# Patient Record
Sex: FEMALE | Race: BLACK OR AFRICAN AMERICAN | NOT HISPANIC OR LATINO | Employment: FULL TIME | URBAN - METROPOLITAN AREA
[De-identification: names, ages, dates, MRNs, and addresses within clinical notes are randomized per-mention and may not be internally consistent; named-entity substitution may affect disease eponyms.]

---

## 2017-06-15 ENCOUNTER — LAB VISIT (OUTPATIENT)
Dept: LAB | Facility: HOSPITAL | Age: 22
End: 2017-06-15
Attending: NURSE PRACTITIONER
Payer: MEDICAID

## 2017-06-15 DIAGNOSIS — Z00.01 ENCOUNTER FOR GENERAL ADULT MEDICAL EXAMINATION WITH ABNORMAL FINDINGS: Primary | ICD-10-CM

## 2017-06-15 LAB
ALBUMIN SERPL BCP-MCNC: 3.7 G/DL
ALP SERPL-CCNC: 94 IU/L
ALT SERPL W/O P-5'-P-CCNC: 43 IU/L
ANION GAP SERPL CALC-SCNC: 10 MMOL/L
AST SERPL-CCNC: 37 IU/L
BASOPHILS # BLD AUTO: 0.03 K/UL
BASOPHILS NFR BLD: 0.4 %
BILIRUB SERPL-MCNC: 0.4 MG/DL
BUN SERPL-MCNC: 10 MG/DL
CALCIUM SERPL-MCNC: 8.8 MG/DL
CHLORIDE SERPL-SCNC: 107 MMOL/L
CHOLEST/HDLC SERPL: 4.5 {RATIO}
CO2 SERPL-SCNC: 25 MMOL/L
CREAT SERPL-MCNC: 0.67 MG/DL
DIFFERENTIAL METHOD: ABNORMAL
EOSINOPHIL # BLD AUTO: 0.3 K/UL
EOSINOPHIL NFR BLD: 3.2 %
ERYTHROCYTE [DISTWIDTH] IN BLOOD BY AUTOMATED COUNT: 15.3 %
EST. GFR  (AFRICAN AMERICAN): >60 ML/MIN/1.73 M^2
EST. GFR  (NON AFRICAN AMERICAN): >60 ML/MIN/1.73 M^2
GLUCOSE SERPL-MCNC: 82 MG/DL
HCT VFR BLD AUTO: 35.8 %
HDL/CHOLESTEROL RATIO: 22.4 %
HDLC SERPL-MCNC: 170 MG/DL
HDLC SERPL-MCNC: 38 MG/DL
HGB BLD-MCNC: 11.3 G/DL
LDLC SERPL CALC-MCNC: 100 MG/DL
LYMPHOCYTES # BLD AUTO: 3.4 K/UL
LYMPHOCYTES NFR BLD: 43.6 %
MCH RBC QN AUTO: 25.3 PG
MCHC RBC AUTO-ENTMCNC: 31.6 %
MCV RBC AUTO: 80 FL
MONOCYTES # BLD AUTO: 0.6 K/UL
MONOCYTES NFR BLD: 8.3 %
NEUTROPHILS # BLD AUTO: 3.4 K/UL
NEUTROPHILS NFR BLD: 44.2 %
NONHDLC SERPL-MCNC: 132 MG/DL
PLATELET # BLD AUTO: 477 K/UL
PMV BLD AUTO: 8.7 FL
POTASSIUM SERPL-SCNC: 3.8 MMOL/L
PROT SERPL-MCNC: 7.4 G/DL
RBC # BLD AUTO: 4.47 M/UL
SODIUM SERPL-SCNC: 142 MMOL/L
TRIGL SERPL-MCNC: 160 MG/DL
TSH SERPL DL<=0.005 MIU/L-ACNC: 1.33 UIU/ML
WBC # BLD AUTO: 7.71 K/UL

## 2017-06-15 PROCEDURE — 85025 COMPLETE CBC W/AUTO DIFF WBC: CPT | Mod: PO

## 2017-06-15 PROCEDURE — 36415 COLL VENOUS BLD VENIPUNCTURE: CPT | Mod: PO

## 2017-06-15 PROCEDURE — 84443 ASSAY THYROID STIM HORMONE: CPT

## 2017-06-15 PROCEDURE — 80061 LIPID PANEL: CPT

## 2017-06-15 PROCEDURE — 80053 COMPREHEN METABOLIC PANEL: CPT | Mod: PO

## 2017-06-15 PROCEDURE — 83036 HEMOGLOBIN GLYCOSYLATED A1C: CPT | Mod: PO

## 2017-06-16 LAB
ESTIMATED AVG GLUCOSE: 117 MG/DL
HBA1C MFR BLD HPLC: 5.7 %

## 2017-11-06 ENCOUNTER — HOSPITAL ENCOUNTER (EMERGENCY)
Facility: HOSPITAL | Age: 22
Discharge: HOME | End: 2017-11-07
Attending: FAMILY MEDICINE
Payer: MEDICAID

## 2017-11-06 VITALS
HEART RATE: 76 BPM | OXYGEN SATURATION: 100 % | RESPIRATION RATE: 20 BRPM | DIASTOLIC BLOOD PRESSURE: 83 MMHG | BODY MASS INDEX: 39.9 KG/M2 | SYSTOLIC BLOOD PRESSURE: 158 MMHG | WEIGHT: 285 LBS | HEIGHT: 71 IN

## 2017-11-06 DIAGNOSIS — N92.6 MENSTRUAL CYCLE DISORDER: Primary | ICD-10-CM

## 2017-11-06 PROCEDURE — 99283 EMERGENCY DEPT VISIT LOW MDM: CPT

## 2017-11-06 RX ORDER — LOSARTAN POTASSIUM 50 MG/1
50 TABLET ORAL DAILY
COMMUNITY

## 2017-11-07 LAB
B-HCG UR QL: NEGATIVE
BASOPHILS # BLD AUTO: 0.03 K/UL
BASOPHILS NFR BLD: 0.3 %
DIFFERENTIAL METHOD: ABNORMAL
EOSINOPHIL # BLD AUTO: 0.2 K/UL
EOSINOPHIL NFR BLD: 1.9 %
ERYTHROCYTE [DISTWIDTH] IN BLOOD BY AUTOMATED COUNT: 14.9 %
HCT VFR BLD AUTO: 37.1 %
HGB BLD-MCNC: 11.7 G/DL
LYMPHOCYTES # BLD AUTO: 4.2 K/UL
LYMPHOCYTES NFR BLD: 46.8 %
MCH RBC QN AUTO: 25.8 PG
MCHC RBC AUTO-ENTMCNC: 31.5 G/DL
MCV RBC AUTO: 82 FL
MONOCYTES # BLD AUTO: 0.5 K/UL
MONOCYTES NFR BLD: 6 %
NEUTROPHILS # BLD AUTO: 4 K/UL
NEUTROPHILS NFR BLD: 44.8 %
PLATELET # BLD AUTO: 464 K/UL
PMV BLD AUTO: 8.3 FL
RBC # BLD AUTO: 4.53 M/UL
WBC # BLD AUTO: 8.87 K/UL

## 2017-11-07 PROCEDURE — 81025 URINE PREGNANCY TEST: CPT

## 2017-11-07 PROCEDURE — 85025 COMPLETE CBC W/AUTO DIFF WBC: CPT

## 2017-11-07 NOTE — ED PROVIDER NOTES
Encounter Date: 11/6/2017       History     Chief Complaint   Patient presents with    Vaginal Bleeding     vaginal ,bleeding for 2 days     23 yo complaining of her period being heavier than normal. No pain. Last period 1 month ago      The history is provided by the patient.   Vaginal Bleeding   This is a new problem. The current episode started 12 to 24 hours ago. The problem occurs constantly. The problem has not changed since onset.Pertinent negatives include no chest pain, no abdominal pain and no shortness of breath. Nothing aggravates the symptoms. Nothing relieves the symptoms.     Review of patient's allergies indicates:  No Known Allergies  Past Medical History:   Diagnosis Date    Anemia     Hypertension      History reviewed. No pertinent surgical history.  History reviewed. No pertinent family history.  Social History   Substance Use Topics    Smoking status: Never Smoker    Smokeless tobacco: Never Used    Alcohol use Yes      Comment: occassinal     Review of Systems   Constitutional: Negative for fever.   HENT: Negative for sore throat.    Respiratory: Negative for shortness of breath.    Cardiovascular: Negative for chest pain.   Gastrointestinal: Negative for abdominal pain and nausea.   Genitourinary: Positive for vaginal bleeding. Negative for dysuria.   Musculoskeletal: Negative for back pain.   Skin: Negative for rash.   Neurological: Negative for weakness.   Hematological: Does not bruise/bleed easily.   All other systems reviewed and are negative.      Physical Exam     Initial Vitals [11/06/17 2354]   BP Pulse Resp Temp SpO2   (!) 158/83 76 20 -- 100 %      MAP       108         Physical Exam    Nursing note and vitals reviewed.  Constitutional: She appears well-developed.   HENT:   Head: Normocephalic and atraumatic.   Right Ear: External ear normal.   Left Ear: External ear normal.   Nose: Nose normal.   Mouth/Throat: Oropharynx is clear and moist.   Eyes: Conjunctivae and EOM are  normal. Pupils are equal, round, and reactive to light. Right eye exhibits no discharge. Left eye exhibits no discharge.   Neck: Normal range of motion. Neck supple. No tracheal deviation present.   Cardiovascular: Normal rate, regular rhythm and normal heart sounds.   No murmur heard.  Pulmonary/Chest: Breath sounds normal. No respiratory distress. She has no wheezes.   Abdominal: Soft. Bowel sounds are normal.   Neurological: She is alert and oriented to person, place, and time.   Skin: Skin is warm and dry.         ED Course   Procedures  Labs Reviewed   CBC W/ AUTO DIFFERENTIAL - Abnormal; Notable for the following:        Result Value    Hemoglobin 11.7 (*)     MCH 25.8 (*)     MCHC 31.5 (*)     RDW 14.9 (*)     Platelets 464 (*)     MPV 8.3 (*)     All other components within normal limits   PREGNANCY TEST, URINE RAPID             Medical Decision Making:   Initial Assessment:   Patient sitting in no distress and pleasant. Patient has no other complaints other than documented.     Differential Diagnosis:   Menstrual cycle  Miscarriage  pregnancy                   ED Course      Clinical Impression:   The encounter diagnosis was Menstrual cycle disorder.                           Varghese Chen MD  11/07/17 0056

## 2017-11-13 DIAGNOSIS — N92.0 EXCESSIVE OR FREQUENT MENSTRUATION: Primary | ICD-10-CM

## 2017-11-14 ENCOUNTER — HOSPITAL ENCOUNTER (OUTPATIENT)
Dept: RADIOLOGY | Facility: HOSPITAL | Age: 22
Discharge: HOME | End: 2017-11-14
Attending: NURSE PRACTITIONER
Payer: MEDICAID

## 2017-11-14 ENCOUNTER — TELEPHONE (OUTPATIENT)
Dept: OBSTETRICS AND GYNECOLOGY | Facility: CLINIC | Age: 22
End: 2017-11-14

## 2017-11-14 DIAGNOSIS — N92.0 EXCESSIVE OR FREQUENT MENSTRUATION: ICD-10-CM

## 2017-11-14 PROCEDURE — 76856 US EXAM PELVIC COMPLETE: CPT | Mod: TC,PO

## 2017-11-22 ENCOUNTER — HOSPITAL ENCOUNTER (EMERGENCY)
Facility: HOSPITAL | Age: 22
Discharge: HOME | End: 2017-11-22
Payer: MEDICAID

## 2017-11-22 VITALS
DIASTOLIC BLOOD PRESSURE: 81 MMHG | OXYGEN SATURATION: 100 % | BODY MASS INDEX: 41.02 KG/M2 | HEART RATE: 77 BPM | WEIGHT: 293 LBS | SYSTOLIC BLOOD PRESSURE: 130 MMHG | RESPIRATION RATE: 17 BRPM | HEIGHT: 71 IN | TEMPERATURE: 98 F

## 2017-11-22 DIAGNOSIS — N92.1 MENORRHAGIA WITH IRREGULAR CYCLE: Primary | ICD-10-CM

## 2017-11-22 LAB
B-HCG UR QL: NEGATIVE
BACTERIA #/AREA URNS AUTO: ABNORMAL /HPF
BASOPHILS # BLD AUTO: 0.04 K/UL
BASOPHILS NFR BLD: 0.5 %
BILIRUB UR QL STRIP: NEGATIVE
CLARITY UR REFRACT.AUTO: ABNORMAL
COLOR UR AUTO: ABNORMAL
DIFFERENTIAL METHOD: ABNORMAL
EOSINOPHIL # BLD AUTO: 0.2 K/UL
EOSINOPHIL NFR BLD: 2 %
ERYTHROCYTE [DISTWIDTH] IN BLOOD BY AUTOMATED COUNT: 14.4 %
GLUCOSE UR QL STRIP: NEGATIVE
HCT VFR BLD AUTO: 31.6 %
HGB BLD-MCNC: 9.7 G/DL
HGB UR QL STRIP: ABNORMAL
HYALINE CASTS UR QL AUTO: 0 /LPF
KETONES UR QL STRIP: NEGATIVE
LEUKOCYTE ESTERASE UR QL STRIP: ABNORMAL
LYMPHOCYTES # BLD AUTO: 3.6 K/UL
LYMPHOCYTES NFR BLD: 40.7 %
MCH RBC QN AUTO: 25 PG
MCHC RBC AUTO-ENTMCNC: 30.7 G/DL
MCV RBC AUTO: 81 FL
MICROSCOPIC COMMENT: ABNORMAL
MONOCYTES # BLD AUTO: 0.9 K/UL
MONOCYTES NFR BLD: 9.8 %
NEUTROPHILS # BLD AUTO: 4.1 K/UL
NEUTROPHILS NFR BLD: 46.8 %
NITRITE UR QL STRIP: NEGATIVE
PH UR STRIP: 7 [PH] (ref 5–8)
PLATELET # BLD AUTO: 545 K/UL
PMV BLD AUTO: 8.3 FL
PROT UR QL STRIP: ABNORMAL
RBC # BLD AUTO: 3.88 M/UL
RBC #/AREA URNS AUTO: 50 /HPF (ref 0–4)
SP GR UR STRIP: 1.01 (ref 1–1.03)
URN SPEC COLLECT METH UR: ABNORMAL
UROBILINOGEN UR STRIP-ACNC: 1 EU/DL
WBC # BLD AUTO: 8.84 K/UL
WBC #/AREA URNS AUTO: 1 /HPF (ref 0–5)

## 2017-11-22 PROCEDURE — 96360 HYDRATION IV INFUSION INIT: CPT

## 2017-11-22 PROCEDURE — 81025 URINE PREGNANCY TEST: CPT

## 2017-11-22 PROCEDURE — 85025 COMPLETE CBC W/AUTO DIFF WBC: CPT

## 2017-11-22 PROCEDURE — 25000003 PHARM REV CODE 250: Performed by: NURSE PRACTITIONER

## 2017-11-22 PROCEDURE — 81000 URINALYSIS NONAUTO W/SCOPE: CPT

## 2017-11-22 PROCEDURE — 99283 EMERGENCY DEPT VISIT LOW MDM: CPT

## 2017-11-22 RX ORDER — TRAMADOL HYDROCHLORIDE 50 MG/1
50 TABLET ORAL EVERY 6 HOURS PRN
COMMUNITY

## 2017-11-22 RX ADMIN — SODIUM CHLORIDE 1000 ML: 0.9 INJECTION, SOLUTION INTRAVENOUS at 05:11

## 2017-11-23 NOTE — ED PROVIDER NOTES
Encounter Date: 11/22/2017       History     Chief Complaint   Patient presents with    Vaginal Bleeding     Pt states has had vaginal bleeding since 11/3/17, states has had vaginal US and bloodwork done for same c/o per Dr Scherer.  Pt states has been wearing tampons and pads.    Also states has been having toothache.       22-year-old female presents to emergency room with vaginal bleeding since 11/3/17.  Patient states she has been seen by the emergency room primary care and ultrasound.  States bleeding has since increased.  Reports occasional dizziness.  Denies any dizziness at this time.  States she saturates 1 pad an hour and has to wear an adult brief because of the heaviness of the bleeding.  Denies any abdominal cramping.  Reports always having abnormal menstrual cycles but never to this extreme.  States last normal menstrual period was in September.  Denies any recent pregnancies.  Reports a miscarriage one year ago.  Has not taken birth control medications in approximately 6 months.          Review of patient's allergies indicates:  No Known Allergies  Past Medical History:   Diagnosis Date    Anemia     Hypertension      History reviewed. No pertinent surgical history.  Family History   Problem Relation Age of Onset    Hypertension Sister     Cancer Maternal Grandmother     Diabetes Maternal Grandmother      Social History   Substance Use Topics    Smoking status: Never Smoker    Smokeless tobacco: Never Used    Alcohol use Yes      Comment: occassinal     Review of Systems   Constitutional: Negative for fever.   HENT: Negative for sore throat.    Respiratory: Negative for shortness of breath.    Cardiovascular: Negative for chest pain.   Gastrointestinal: Negative for nausea.   Genitourinary: Positive for vaginal bleeding. Negative for dysuria.   Musculoskeletal: Negative for back pain.   Skin: Negative for rash.   Neurological: Positive for dizziness. Negative for weakness.   Hematological:  Does not bruise/bleed easily.   All other systems reviewed and are negative.      Physical Exam     Initial Vitals [11/22/17 1619]   BP Pulse Resp Temp SpO2   (!) 161/80 75 18 97.7 °F (36.5 °C) 100 %      MAP       107         Physical Exam    Nursing note and vitals reviewed.  Constitutional: She appears well-developed and well-nourished. No distress.   HENT:   Head: Normocephalic.   Eyes: Conjunctivae are normal.   Neck: Normal range of motion. Neck supple.   Cardiovascular: Normal rate.   Pulmonary/Chest: Breath sounds normal. No respiratory distress.   Abdominal: Soft. Bowel sounds are normal. She exhibits no distension and no abdominal bruit. There is no tenderness. No hernia.   Neurological: She is alert and oriented to person, place, and time.   Skin: Skin is warm and dry. Capillary refill takes less than 2 seconds.   Psychiatric: She has a normal mood and affect. Her behavior is normal. Judgment and thought content normal.         ED Course   Procedures  Labs Reviewed   CBC W/ AUTO DIFFERENTIAL - Abnormal; Notable for the following:        Result Value    RBC 3.88 (*)     Hemoglobin 9.7 (*)     Hematocrit 31.6 (*)     MCV 81 (*)     MCH 25.0 (*)     MCHC 30.7 (*)     Platelets 545 (*)     MPV 8.3 (*)     All other components within normal limits   URINALYSIS - Abnormal; Notable for the following:     Color, UA Red (*)     Appearance, UA Hazy (*)     Protein, UA 1+ (*)     Occult Blood UA 3+ (*)     Leukocytes, UA 3+ (*)     All other components within normal limits   PREGNANCY TEST, URINE RAPID   URINALYSIS MICROSCOPIC             Medical Decision Making:   Initial Assessment:   22-year-old female presents to emergency room with vaginal bleeding since 11/3/17.  Patient states she has been seen by the emergency room primary care and ultrasound.  States bleeding has since increased.  Reports occasional dizziness.  Denies any dizziness at this time.  States she saturates 1 pad an hour and has to wear an adult  brief because of the heaviness of the bleeding.  Denies any abdominal cramping.  Reports always having abnormal menstrual cycles but never to this extreme.  States last normal menstrual period was in September.  Denies any recent pregnancies.  Reports a miscarriage one year ago.  Has not taken birth control medications in approximately 6 months.  Differential Diagnosis:   Vaginal bleeding, irregular menses, dysmenorrhea, vaginal lesion, cervical cancer, ovarian cancer,  Clinical Tests:   Lab Tests: Ordered and Reviewed  Radiological Study: Reviewed  ED Management:  Review the patient's ultrasound from 1 week ago.  There was no acute findings.  Uterus lining was 4 mm.  Urine specimen has +3 blood however it was not a sterile specimen.  I believe this bleeding is due to vaginal blood.  Hemoglobin decreased 2 points and 2 weeks.  I spoke with Dr. Meyer who is on-call for OB/GYN.  A message was sent to Dr. Burnett staff to have patient seen in the next week in office.  I discussed birth control with patient.  The patient states she received a prescription one month ago from a doctor in [place] when her Pap smear was performed but she has not taken the medications.  I encouraged the patient to take the birth control as previously prescribed and follow-up with primary care and Dr. Burnett.  Patient verbalized understanding.  I educated the patient on worsening symptoms of blood loss and when to return to the emergency room.  Patient verbalized understanding.                   ED Course      Clinical Impression:   The encounter diagnosis was Menorrhagia with irregular cycle.                           Shilpa Patel NP  11/29/17 8632

## 2017-11-23 NOTE — ED NOTES
Pt awaiting lab results. Pt has no c/o anything at this time. Watching tv with male friend at bedside

## 2017-11-24 ENCOUNTER — TELEPHONE (OUTPATIENT)
Dept: OBSTETRICS AND GYNECOLOGY | Facility: CLINIC | Age: 22
End: 2017-11-24

## 2017-11-24 NOTE — TELEPHONE ENCOUNTER
----- Message from Julissa Meyer MD sent at 11/22/2017  6:31 PM CST -----  This pt has been seen in the ED twice for heavy menstrual bleeding with normal U/S and H/H:9.7/31.6 and wanted to f/u Dr. Burnett in [facility].

## 2017-11-27 ENCOUNTER — TELEPHONE (OUTPATIENT)
Dept: OBSTETRICS AND GYNECOLOGY | Facility: CLINIC | Age: 22
End: 2017-11-27

## 2017-11-27 NOTE — TELEPHONE ENCOUNTER
----- Message from Julissa Meyer MD sent at 11/22/2017  6:31 PM CST -----  This pt has been seen in the ED twice for heavy menstrual bleeding with normal U/S and H/H:9.7/31.6 and wanted to f/u Dr. Burnett in [facility].

## 2018-10-03 ENCOUNTER — OCCUPATIONAL HEALTH (OUTPATIENT)
Dept: URGENT CARE | Facility: CLINIC | Age: 23
End: 2018-10-03

## 2018-10-03 DIAGNOSIS — Z02.83 ENCOUNTER FOR DRUG SCREENING: Primary | ICD-10-CM

## 2018-10-03 PROCEDURE — 80305 DRUG TEST PRSMV DIR OPT OBS: CPT | Mod: S$GLB,,, | Performed by: PREVENTIVE MEDICINE

## 2020-11-05 ENCOUNTER — HOSPITAL ENCOUNTER (EMERGENCY)
Facility: HOSPITAL | Age: 25
Discharge: HOME | End: 2020-11-05
Attending: EMERGENCY MEDICINE
Payer: MEDICAID

## 2020-11-05 VITALS
BODY MASS INDEX: 41.02 KG/M2 | OXYGEN SATURATION: 100 % | WEIGHT: 293 LBS | DIASTOLIC BLOOD PRESSURE: 91 MMHG | TEMPERATURE: 99 F | HEART RATE: 85 BPM | HEIGHT: 71 IN | RESPIRATION RATE: 16 BRPM | SYSTOLIC BLOOD PRESSURE: 129 MMHG

## 2020-11-05 DIAGNOSIS — R07.89 LEFT-SIDED CHEST WALL PAIN: Primary | ICD-10-CM

## 2020-11-05 DIAGNOSIS — R07.9 CHEST PAIN: ICD-10-CM

## 2020-11-05 LAB
ALBUMIN SERPL BCP-MCNC: 3.5 G/DL (ref 3.5–5.2)
ALP SERPL-CCNC: 122 U/L (ref 55–135)
ALT SERPL W/O P-5'-P-CCNC: 36 U/L (ref 10–44)
ANION GAP SERPL CALC-SCNC: 11 MMOL/L (ref 8–16)
AST SERPL-CCNC: 50 U/L (ref 10–40)
B-HCG UR QL: NEGATIVE
BASOPHILS # BLD AUTO: 0.04 K/UL (ref 0–0.2)
BASOPHILS NFR BLD: 0.4 % (ref 0–1.9)
BILIRUB SERPL-MCNC: 0.2 MG/DL (ref 0.1–1)
BUN SERPL-MCNC: 4 MG/DL (ref 6–20)
CALCIUM SERPL-MCNC: 8.9 MG/DL (ref 8.7–10.5)
CHLORIDE SERPL-SCNC: 105 MMOL/L (ref 95–110)
CO2 SERPL-SCNC: 21 MMOL/L (ref 23–29)
CREAT SERPL-MCNC: 0.7 MG/DL (ref 0.5–1.4)
CTP QC/QA: YES
DIFFERENTIAL METHOD: ABNORMAL
EOSINOPHIL # BLD AUTO: 0.2 K/UL (ref 0–0.5)
EOSINOPHIL NFR BLD: 2.1 % (ref 0–8)
ERYTHROCYTE [DISTWIDTH] IN BLOOD BY AUTOMATED COUNT: 15.9 % (ref 11.5–14.5)
EST. GFR  (AFRICAN AMERICAN): >60 ML/MIN/1.73 M^2
EST. GFR  (NON AFRICAN AMERICAN): >60 ML/MIN/1.73 M^2
GLUCOSE SERPL-MCNC: 136 MG/DL (ref 70–110)
HCT VFR BLD AUTO: 35.5 % (ref 37–48.5)
HGB BLD-MCNC: 10.4 G/DL (ref 12–16)
IMM GRANULOCYTES # BLD AUTO: 0.03 K/UL (ref 0–0.04)
IMM GRANULOCYTES NFR BLD AUTO: 0.3 % (ref 0–0.5)
LYMPHOCYTES # BLD AUTO: 3.9 K/UL (ref 1–4.8)
LYMPHOCYTES NFR BLD: 39.4 % (ref 18–48)
MCH RBC QN AUTO: 23 PG (ref 27–31)
MCHC RBC AUTO-ENTMCNC: 29.3 G/DL (ref 32–36)
MCV RBC AUTO: 79 FL (ref 82–98)
MONOCYTES # BLD AUTO: 0.7 K/UL (ref 0.3–1)
MONOCYTES NFR BLD: 7.4 % (ref 4–15)
NEUTROPHILS # BLD AUTO: 5 K/UL (ref 1.8–7.7)
NEUTROPHILS NFR BLD: 50.4 % (ref 38–73)
NRBC BLD-RTO: 0 /100 WBC
PLATELET # BLD AUTO: 504 K/UL (ref 150–350)
PMV BLD AUTO: 8.4 FL (ref 9.2–12.9)
POTASSIUM SERPL-SCNC: 4.3 MMOL/L (ref 3.5–5.1)
PROT SERPL-MCNC: 8.2 G/DL (ref 6–8.4)
RBC # BLD AUTO: 4.52 M/UL (ref 4–5.4)
SODIUM SERPL-SCNC: 137 MMOL/L (ref 136–145)
TROPONIN I SERPL DL<=0.01 NG/ML-MCNC: 0.01 NG/ML (ref 0–0.03)
WBC # BLD AUTO: 9.93 K/UL (ref 3.9–12.7)

## 2020-11-05 PROCEDURE — 93010 ELECTROCARDIOGRAM REPORT: CPT | Mod: ,,, | Performed by: INTERNAL MEDICINE

## 2020-11-05 PROCEDURE — 99284 PR EMERGENCY DEPT VISIT,LEVEL IV: ICD-10-PCS | Mod: ,,, | Performed by: EMERGENCY MEDICINE

## 2020-11-05 PROCEDURE — 99285 EMERGENCY DEPT VISIT HI MDM: CPT | Mod: 25

## 2020-11-05 PROCEDURE — 25000003 PHARM REV CODE 250: Performed by: EMERGENCY MEDICINE

## 2020-11-05 PROCEDURE — 80053 COMPREHEN METABOLIC PANEL: CPT

## 2020-11-05 PROCEDURE — 84484 ASSAY OF TROPONIN QUANT: CPT

## 2020-11-05 PROCEDURE — 99284 EMERGENCY DEPT VISIT MOD MDM: CPT | Mod: ,,, | Performed by: EMERGENCY MEDICINE

## 2020-11-05 PROCEDURE — 96360 HYDRATION IV INFUSION INIT: CPT

## 2020-11-05 PROCEDURE — 85025 COMPLETE CBC W/AUTO DIFF WBC: CPT

## 2020-11-05 PROCEDURE — 93005 ELECTROCARDIOGRAM TRACING: CPT

## 2020-11-05 PROCEDURE — 81025 URINE PREGNANCY TEST: CPT | Performed by: EMERGENCY MEDICINE

## 2020-11-05 PROCEDURE — 93010 EKG 12-LEAD: ICD-10-PCS | Mod: ,,, | Performed by: INTERNAL MEDICINE

## 2020-11-05 RX ORDER — MEDROXYPROGESTERONE ACETATE 10 MG/1
10 TABLET ORAL DAILY
COMMUNITY

## 2020-11-05 RX ORDER — NAPROXEN 500 MG/1
500 TABLET ORAL 2 TIMES DAILY WITH MEALS
Qty: 30 TABLET | Refills: 0 | Status: SHIPPED | OUTPATIENT
Start: 2020-11-05

## 2020-11-05 RX ORDER — METFORMIN HYDROCHLORIDE 500 MG/1
500 TABLET ORAL 2 TIMES DAILY WITH MEALS
COMMUNITY

## 2020-11-05 RX ORDER — NAPROXEN 500 MG/1
500 TABLET ORAL
Status: COMPLETED | OUTPATIENT
Start: 2020-11-05 | End: 2020-11-05

## 2020-11-05 RX ADMIN — NAPROXEN 500 MG: 500 TABLET ORAL at 08:11

## 2020-11-05 RX ADMIN — SODIUM CHLORIDE 1000 ML: 0.9 INJECTION, SOLUTION INTRAVENOUS at 08:11

## 2020-11-05 NOTE — Clinical Note
"Lisebth Estes" Dean was seen and treated in our emergency department on 11/5/2020.  She may return to work on 11/09/2020.       If you have any questions or concerns, please don't hesitate to call.      Brown Weber RN    "

## 2020-11-05 NOTE — Clinical Note
"Lisbeth Estes" Dean was seen and treated in our emergency department on 11/5/2020.  She may return to work on 11/09/2020.       If you have any questions or concerns, please don't hesitate to call.      Brown Weber RN    "

## 2020-11-06 NOTE — ED TRIAGE NOTES
Pt states that she started to feel tightness in her that started yesterday. Pt states that tightness and pain increased today. Pt states that as long as she remains still she doesn't feel pain, any movement causes a sharp pain in the right side of her chest pt rates as 10 out of 10 when she moves. Pt denies SOB or N/V/D.

## 2020-11-06 NOTE — ED PROVIDER NOTES
"Encounter Date: 11/5/2020    SCRIBE #1 NOTE: I, Mireya Gagandeep, am scribing for, and in the presence of,  Ethan Salvador MD. I have scribed the following portions of the note - Other sections scribed: HPI ROS PE.       History     Chief Complaint   Patient presents with    Chest Pain     Pt reports onset of left sided CP 12 hours ago while sitting down at work. Pt reports that pain radiates down her left arm. Pt describes pain as "someone punching me." Pt denies cardiac pmh.     Time patient was seen by the provider: 8:25 PM      The patient is a 25 y.o. female with past medical history of anemia, HTN, who presents to the ED with a complaint of chest pain onset yesterday. The patient reports of a sharp, pressure sensation to the left side of her chest and radiates to her left arm. The pain has been gradually worsening since onset. The pain is exacerbated by movement of her left arm. Denies any injuries or trauma to the chest wall. Denies history of DVT or PE. Patient is on Provera. Denies nausea, vomiting, diarrhea, fever, cough, SOB, abdominal pain, dysuria. A ten point review of systems was completed and is negative except as documented above.  Patient denies any other acute medical complaint. The patients available PMH, PSH, Social History, medications, allergies, and triage vital signs were reviewed just prior to their medical evaluation.      The history is provided by the patient and medical records. No  was used.     Review of patient's allergies indicates:  No Known Allergies  Past Medical History:   Diagnosis Date    Anemia     Hypertension      History reviewed. No pertinent surgical history.  Family History   Problem Relation Age of Onset    Hypertension Sister     Cancer Maternal Grandmother     Diabetes Maternal Grandmother      Social History     Tobacco Use    Smoking status: Current Every Day Smoker     Types: Cigarettes    Smokeless tobacco: Never Used    Tobacco " comment: Few cigarettes a day   Substance Use Topics    Alcohol use: Yes     Comment: occassinal    Drug use: No     Review of Systems   Constitutional: Negative for fever.   HENT: Negative for sore throat.    Eyes: Negative for visual disturbance.   Respiratory: Negative for cough and shortness of breath.    Cardiovascular: Positive for chest pain.   Gastrointestinal: Negative for abdominal pain, diarrhea, nausea and vomiting.   Genitourinary: Negative for dysuria.   Musculoskeletal: Negative for neck pain.   Skin: Negative for rash and wound.   Allergic/Immunologic: Negative for immunocompromised state.   Neurological: Negative for syncope.   Psychiatric/Behavioral: Negative for confusion.       Physical Exam     Initial Vitals   BP Pulse Resp Temp SpO2   11/05/20 1929 11/05/20 1928 11/05/20 1928 11/05/20 1928 11/05/20 1928   (!) 191/115 (!) 127 20 99.1 °F (37.3 °C) 100 %      MAP       --                Physical Exam    Nursing note and vitals reviewed.  Constitutional: She appears well-developed and well-nourished. She is not diaphoretic. No distress.   HENT:   Head: Normocephalic and atraumatic.   Nose: Nose normal.   Eyes: EOM are normal. Pupils are equal, round, and reactive to light. Right eye exhibits no discharge. Left eye exhibits no discharge.   Neck: Normal range of motion. Neck supple.   Cardiovascular: Regular rhythm and normal heart sounds. Tachycardia present.  Exam reveals no gallop and no friction rub.    No murmur heard.  Pulmonary/Chest: Breath sounds normal. No respiratory distress. She has no wheezes. She has no rhonchi. She has no rales. She exhibits tenderness.   Left chest wall tenderness to palpation.   Abdominal: Soft. She exhibits no distension. There is no abdominal tenderness. There is no rebound and no guarding.   Musculoskeletal: Normal range of motion. No tenderness or edema.   Neurological: She is alert and oriented to person, place, and time. She has normal strength. GCS score  is 15. GCS eye subscore is 4. GCS verbal subscore is 5. GCS motor subscore is 6.   Skin: Skin is warm and dry. No rash noted. No erythema.   Psychiatric: She has a normal mood and affect. Her behavior is normal. Judgment and thought content normal.         ED Course   Procedures  Labs Reviewed   CBC W/ AUTO DIFFERENTIAL - Abnormal; Notable for the following components:       Result Value    Hemoglobin 10.4 (*)     Hematocrit 35.5 (*)     MCV 79 (*)     MCH 23.0 (*)     MCHC 29.3 (*)     RDW 15.9 (*)     Platelets 504 (*)     MPV 8.4 (*)     All other components within normal limits   COMPREHENSIVE METABOLIC PANEL - Abnormal; Notable for the following components:    CO2 21 (*)     Glucose 136 (*)     BUN 4 (*)     AST 50 (*)     All other components within normal limits   TROPONIN I   POCT URINE PREGNANCY     EKG Readings: (Independently Interpreted)   Initial Reading: No STEMI. Rhythm: Sinus Tachycardia. Heart Rate: 109. Ectopy: No Ectopy. Conduction: Normal. ST Segments: Normal ST Segments. T Waves: Normal.     ECG Results          EKG 12-lead (Final result)  Result time 11/06/20 13:41:59    Final result by Interface, Lab In [facility] (11/06/20 13:41:59)                 Narrative:    Test Reason : R07.9,    Vent. Rate : 109 BPM     Atrial Rate : 109 BPM     P-R Int : 166 ms          QRS Dur : 100 ms      QT Int : 346 ms       P-R-T Axes : 056 035 048 degrees     QTc Int : 465 ms    Sinus tachycardia  Otherwise normal ECG  When compared with ECG of 10-SEP-2017 21:40,  No significant change was found  Confirmed by VELIA KAUR MD ([id number]) on 11/6/2020 1:41:52 PM    Referred By: [email or web address]   SELF           Confirmed By:VELIA KAUR MD                            Imaging Results          X-Ray Chest PA And Lateral (Final result)  Result time 11/05/20 21:10:40    Final result by Kofi Barraza MD (11/05/20 21:10:40)                 Impression:      No acute radiographic abnormality      Electronically signed by: Kofi  Ramona  Date:    11/05/2020  Time:    21:10             Narrative:    EXAMINATION:  XR CHEST PA AND LATERAL    CLINICAL HISTORY:  Other chest pain    TECHNIQUE:  PA and lateral views of the chest were performed.    COMPARISON:  None    FINDINGS:  The lungs are clear, with normal appearance of pulmonary vasculature and no pleural effusion or pneumothorax.    The cardiac silhouette is normal in size. The hilar and mediastinal contours are unremarkable.    Bones are intact.                                 Medical Decision Making:   History:   Old Medical Records: I decided to obtain old medical records.  Independently Interpreted Test(s):   I have ordered and independently interpreted EKG Reading(s) - see prior notes  Clinical Tests:   Lab Tests: Ordered and Reviewed  Radiological Study: Ordered and Reviewed  Medical Tests: Ordered and Reviewed  ED Management:  25-year-old female presents with left-sided chest wall pain.  Vitals normal.  Physical exam as above.  Labs unremarkable including troponin x1.  No indication for repeat troponin testing given duration of symptoms.  EKG without acute ischemia.  Chest x-ray unremarkable.  Doubt ACS, PE, dissection, PNX, PNA, or tamponade.  Improved in the ED with naproxen.  Will discharge with the same.  She will call her primary physician on Monday to arrange close follow-up.  Patient will return to ED for worsening symptoms, inability to eat/drink, fever greater than 100.4, or any other concerns.  Did bedside teaching with return precautions.  All questions answered.  The patient acknowledges understanding.  Gave verbal discharge instructions.            Scribe Attestation:   Scribe #1: I performed the above scribed service and the documentation accurately describes the services I performed. I attest to the accuracy of the note.                      Clinical Impression:       ICD-10-CM ICD-9-CM   1. Left-sided chest wall pain  R07.89 786.52   2. Chest pain  R07.9 786.50                       Disposition:   Disposition: Discharged  Condition: Stable     ED Disposition Condition    Discharge Stable        ED Prescriptions     Medication Sig Dispense Start Date End Date Auth. Provider    naproxen (NAPROSYN) 500 MG tablet Take 1 tablet (500 mg total) by mouth 2 (two) times daily with meals. 30 tablet 11/5/2020  Ethan Salvador MD        Follow-up Information     Follow up With Specialties Details Why Contact Info    Follow up with primary physician as soon as possible.  Call tomorrow for an appointment.        [facility]  Return to ED for worsening symptoms, inability to eat/drink, fever greater than 100.4, or any other concerns. [address] [facility] [id number] [phone]                      Level of Complexity:  High, level 5.                 Ethan Salvador MD  11/06/20 2460

## 2020-11-06 NOTE — ED NOTES
Patient identifiers for Lisbeth Moran checked and correct.  LOC: Patient is awake, alert, and aware of environment with an appropriate affect. Patient is oriented x 3 and speaking appropriately.  APPEARANCE: Patient resting comfortably and in no acute distress. Patient is clean and well groomed, patient's clothing is properly fastened.  SKIN: The skin is warm and dry. Patient has normal skin turgor and moist mucus membrances. Skin is intact; no bruising or breakdown noted.  MUSKULOSKELETAL: Patient is moving all extremities well, no obvious deformities noted. Pulses intact.   RESPIRATORY: Airway is open and patent. Respirations are spontaneous and non-labored with normal effort and rate.  CARDIAC: Patient has a normal rate and rhythm. No peripheral edema noted. Capillary refill < 3 seconds.  ABDOMEN: No distention noted. Bowel sounds active in all 4 quadrants. Soft and non-tender upon palpation.  NEUROLOGICAL: PERRL. Facial expression is symmetrical. Hand grasps are equal bilaterally. Normal sensation in all extremities when touched with finger.  Allergies reported: Review of patient's allergies indicates:  No Known Allergies

## 2020-11-06 NOTE — DISCHARGE INSTRUCTIONS
Do not take motrin/advil/ibuprofen.  You may take Tylenol over the counter as directed on packaging.     Our goal in the emergency department is to always give you outstanding care and exceptional service. You may receive a survey by mail or e-mail in the next week regarding your experience in our ED. We would greatly appreciate your completing and returning the survey. Your feedback provides us with a way to recognize our staff who give very good care and it helps us learn how to improve when your experience was below our aspiration of excellence.

## 2021-12-06 ENCOUNTER — HOSPITAL ENCOUNTER (OUTPATIENT)
Dept: MEDSURG UNIT | Facility: HOSPITAL | Age: 26
End: 2021-12-08
Attending: INTERNAL MEDICINE | Admitting: INTERNAL MEDICINE

## 2021-12-06 LAB
% SATURATION: 74 %
ABS NEUT (OLG): 3.19 X10(3)/MCL (ref 2.1–9.2)
ALBUMIN SERPL-MCNC: 4.3 GM/DL (ref 3.5–5)
ALBUMIN/GLOB SERPL: 1.1 RATIO (ref 1.1–2)
ALP SERPL-CCNC: 195 UNIT/L (ref 40–150)
ALT SERPL-CCNC: 36 UNIT/L (ref 0–55)
APPEARANCE, UA: CLEAR
AST SERPL-CCNC: 18 UNIT/L (ref 5–34)
B-OH-BUTYR SERPL-MCNC: 2.95 MMOL/L
B-OH-BUTYR SERPL-MCNC: 3.15 MMOL/L
BACTERIA #/AREA URNS AUTO: ABNORMAL /HPF
BASOPHILS # BLD AUTO: 0.1 X10(3)/MCL (ref 0–0.2)
BASOPHILS NFR BLD AUTO: 1 %
BILIRUB SERPL-MCNC: 0.4 MG/DL
BILIRUB SERPL-MCNC: NEGATIVE MG/DL
BILIRUB UR QL STRIP: NEGATIVE
BILIRUBIN DIRECT+TOT PNL SERPL-MCNC: 0.2 MG/DL (ref 0–0.5)
BILIRUBIN DIRECT+TOT PNL SERPL-MCNC: 0.2 MG/DL (ref 0–0.8)
BLOOD URINE, POC: NEGATIVE
BUN SERPL-MCNC: 6.4 MG/DL (ref 7–18.7)
BUN SERPL-MCNC: 7.8 MG/DL (ref 7–18.7)
CALCIUM SERPL-MCNC: 10.9 MG/DL (ref 8.7–10.5)
CALCIUM SERPL-MCNC: 9.7 MG/DL (ref 8.7–10.5)
CHLORIDE SERPL-SCNC: 103 MMOL/L (ref 98–107)
CHLORIDE SERPL-SCNC: 98 MMOL/L (ref 98–107)
CLARITY, POC UA: CLEAR
CO HGB VEN: 3.5 % (ref 0.5–1.5)
CO2 SERPL-SCNC: 21 MMOL/L (ref 22–29)
CO2 SERPL-SCNC: 21 MMOL/L (ref 22–29)
COLOR UR: ABNORMAL
COLOR, POC UA: YELLOW
CREAT SERPL-MCNC: 0.97 MG/DL (ref 0.55–1.02)
CREAT SERPL-MCNC: 1.23 MG/DL (ref 0.55–1.02)
CREAT/UREA NIT SERPL: 7
D BASE VENOUS: -4 (ref -2–2)
EOSINOPHIL # BLD AUTO: 0.3 X10(3)/MCL (ref 0–0.9)
EOSINOPHIL NFR BLD AUTO: 3 %
ERYTHROCYTE [DISTWIDTH] IN BLOOD BY AUTOMATED COUNT: 14.6 % (ref 11.5–14.5)
EST. AVERAGE GLUCOSE BLD GHB EST-MCNC: 237.4 MG/DL
GLOBULIN SER-MCNC: 3.9 GM/DL (ref 2.4–3.5)
GLUCOSE (UA): >1000 MG/DL
GLUCOSE SERPL-MCNC: 346 MG/DL (ref 74–100)
GLUCOSE SERPL-MCNC: 474 MG/DL (ref 74–100)
GLUCOSE UR QL STRIP: NORMAL
HBA1C MFR BLD: 9.9 %
HCO3 VENOUS: 23 MMOL/L (ref 25–40)
HCT VFR BLD AUTO: 38.2 % (ref 35–46)
HGB BLD-MCNC: 12.5 GM/DL (ref 12–16)
HGB UR QL STRIP: 0.06 MG/DL
HYALINE CASTS #/AREA URNS LPF: ABNORMAL /LPF
IMM GRANULOCYTES # BLD AUTO: 0.01 10*3/UL
IMM GRANULOCYTES NFR BLD AUTO: 0 %
KETONES UR QL STRIP: >150 MG/DL
KETONES UR QL STRIP: NORMAL
LEUKOCYTE EST, POC UA: NEGATIVE
LEUKOCYTE ESTERASE UR QL STRIP: NEGATIVE
LYMPHOCYTES # BLD AUTO: 4.2 X10(3)/MCL (ref 0.6–4.6)
LYMPHOCYTES NFR BLD AUTO: 50 %
MAGNESIUM SERPL-MCNC: 1.7 MG/DL (ref 1.6–2.6)
MAGNESIUM SERPL-MCNC: 1.8 MG/DL (ref 1.6–2.6)
MCH RBC QN AUTO: 26.5 PG (ref 26–34)
MCHC RBC AUTO-ENTMCNC: 32.7 GM/DL (ref 31–37)
MCV RBC AUTO: 80.9 FL (ref 80–100)
MET HGB VEN: 1.1 % (ref 0–1.5)
MONOCYTES # BLD AUTO: 0.8 X10(3)/MCL (ref 0.1–1.3)
MONOCYTES NFR BLD AUTO: 9 %
NEUTROPHILS # BLD AUTO: 3.19 X10(3)/MCL (ref 2.1–9.2)
NEUTROPHILS NFR BLD AUTO: 37 %
NITRITE UR QL STRIP: NEGATIVE
NITRITE, POC UA: NEGATIVE
NRBC BLD AUTO-RTO: 0 % (ref 0–0.2)
O2 HGB VENOUS: 70.9 % (ref 40–85)
PCO2 VENOUS: 51 MMHG (ref 41–51)
PH UR STRIP: 5.5 [PH] (ref 4.5–8)
PH VENOUS: 7.27 (ref 7.32–7.42)
PH, POC UA: 5.5
PHOSPHATE SERPL-MCNC: 3.3 MG/DL (ref 2.3–4.7)
PHOSPHATE SERPL-MCNC: 4.8 MG/DL (ref 2.3–4.7)
PLATELET # BLD AUTO: 520 X10(3)/MCL (ref 130–400)
PMV BLD AUTO: 9.3 FL (ref 7.4–10.4)
PO2 VENOUS: 41 MMHG (ref 30–100)
POC BETA-HCG (QUAL): NEGATIVE
POTASSIUM SERPL-SCNC: 4.3 MMOL/L (ref 3.5–5.1)
POTASSIUM SERPL-SCNC: 4.9 MMOL/L (ref 3.5–5.1)
PROT SERPL-MCNC: 8.2 GM/DL (ref 6.4–8.3)
PROT UR QL STRIP: 20 MG/DL
PROTEIN, POC: NEGATIVE
RBC # BLD AUTO: 4.72 X10(6)/MCL (ref 4–5.2)
RBC #/AREA URNS AUTO: ABNORMAL /HPF
SAMPLE VEN: ABNORMAL
SARS-COV-2 AG RESP QL IA.RAPID: NEGATIVE
SITE VEN: ABNORMAL
SODIUM SERPL-SCNC: 134 MMOL/L (ref 136–145)
SODIUM SERPL-SCNC: 137 MMOL/L (ref 136–145)
SP GR UR STRIP: 1.04 (ref 1–1.03)
SPECIFIC GRAVITY, POC UA: 1.02
SQUAMOUS #/AREA URNS LPF: >100 /LPF
THB VEN: 13.2 GM/DL (ref 12–18)
TREATMENT VEN: ABNORMAL
UROBILINOGEN UR STRIP-ACNC: NORMAL
UROBILINOGEN, POC UA: NORMAL
WBC # SPEC AUTO: 8.5 X10(3)/MCL (ref 4.5–11)
WBC #/AREA URNS AUTO: ABNORMAL /HPF

## 2021-12-07 LAB
ABS NEUT (OLG): 3.32 X10(3)/MCL (ref 2.1–9.2)
ALBUMIN SERPL-MCNC: 3.5 GM/DL (ref 3.5–5)
ALBUMIN/GLOB SERPL: 1 RATIO (ref 1.1–2)
ALP SERPL-CCNC: 140 UNIT/L (ref 40–150)
ALT SERPL-CCNC: 30 UNIT/L (ref 0–55)
AST SERPL-CCNC: 19 UNIT/L (ref 5–34)
BASOPHILS # BLD AUTO: 0 X10(3)/MCL (ref 0–0.2)
BASOPHILS NFR BLD AUTO: 0 %
BILIRUB SERPL-MCNC: 0.5 MG/DL
BILIRUBIN DIRECT+TOT PNL SERPL-MCNC: 0.2 MG/DL (ref 0–0.5)
BILIRUBIN DIRECT+TOT PNL SERPL-MCNC: 0.3 MG/DL (ref 0–0.8)
BUN SERPL-MCNC: 6.1 MG/DL (ref 7–18.7)
BUN SERPL-MCNC: 7.4 MG/DL (ref 7–18.7)
BUN SERPL-MCNC: 7.8 MG/DL (ref 7–18.7)
CALCIUM SERPL-MCNC: 8.9 MG/DL (ref 8.7–10.5)
CALCIUM SERPL-MCNC: 9.1 MG/DL (ref 8.7–10.5)
CALCIUM SERPL-MCNC: 9.1 MG/DL (ref 8.7–10.5)
CHLORIDE SERPL-SCNC: 101 MMOL/L (ref 98–107)
CHLORIDE SERPL-SCNC: 101 MMOL/L (ref 98–107)
CHLORIDE SERPL-SCNC: 103 MMOL/L (ref 98–107)
CO2 SERPL-SCNC: 21 MMOL/L (ref 22–29)
CREAT SERPL-MCNC: 0.83 MG/DL (ref 0.55–1.02)
CREAT SERPL-MCNC: 0.88 MG/DL (ref 0.55–1.02)
CREAT SERPL-MCNC: 0.95 MG/DL (ref 0.55–1.02)
CREAT/UREA NIT SERPL: 7
CREAT/UREA NIT SERPL: 9
EOSINOPHIL # BLD AUTO: 0.3 X10(3)/MCL (ref 0–0.9)
EOSINOPHIL NFR BLD AUTO: 4 %
ERYTHROCYTE [DISTWIDTH] IN BLOOD BY AUTOMATED COUNT: 14.7 % (ref 11.5–14.5)
GLOBULIN SER-MCNC: 3.4 GM/DL (ref 2.4–3.5)
GLUCOSE SERPL-MCNC: 396 MG/DL (ref 74–100)
GLUCOSE SERPL-MCNC: 398 MG/DL (ref 74–100)
GLUCOSE SERPL-MCNC: 406 MG/DL (ref 74–100)
HCT VFR BLD AUTO: 34.9 % (ref 35–46)
HGB BLD-MCNC: 11.2 GM/DL (ref 12–16)
IMM GRANULOCYTES # BLD AUTO: 0.04 10*3/UL
IMM GRANULOCYTES NFR BLD AUTO: 0 %
LYMPHOCYTES # BLD AUTO: 4.1 X10(3)/MCL (ref 0.6–4.6)
LYMPHOCYTES NFR BLD AUTO: 47 %
MAGNESIUM SERPL-MCNC: 1.5 MG/DL (ref 1.6–2.6)
MAGNESIUM SERPL-MCNC: 2.2 MG/DL (ref 1.6–2.6)
MCH RBC QN AUTO: 26.1 PG (ref 26–34)
MCHC RBC AUTO-ENTMCNC: 32.1 GM/DL (ref 31–37)
MCV RBC AUTO: 81.4 FL (ref 80–100)
MONOCYTES # BLD AUTO: 0.8 X10(3)/MCL (ref 0.1–1.3)
MONOCYTES NFR BLD AUTO: 10 %
NEUTROPHILS # BLD AUTO: 3.32 X10(3)/MCL (ref 2.1–9.2)
NEUTROPHILS NFR BLD AUTO: 38 %
NRBC BLD AUTO-RTO: 0 % (ref 0–0.2)
PHOSPHATE SERPL-MCNC: 2.8 MG/DL (ref 2.3–4.7)
PHOSPHATE SERPL-MCNC: 3.3 MG/DL (ref 2.3–4.7)
PLATELET # BLD AUTO: 468 X10(3)/MCL (ref 130–400)
PMV BLD AUTO: 9 FL (ref 7.4–10.4)
POTASSIUM SERPL-SCNC: 3.7 MMOL/L (ref 3.5–5.1)
POTASSIUM SERPL-SCNC: 3.7 MMOL/L (ref 3.5–5.1)
POTASSIUM SERPL-SCNC: 4.4 MMOL/L (ref 3.5–5.1)
PROT SERPL-MCNC: 6.9 GM/DL (ref 6.4–8.3)
RBC # BLD AUTO: 4.29 X10(6)/MCL (ref 4–5.2)
SODIUM SERPL-SCNC: 132 MMOL/L (ref 136–145)
SODIUM SERPL-SCNC: 133 MMOL/L (ref 136–145)
SODIUM SERPL-SCNC: 136 MMOL/L (ref 136–145)
WBC # SPEC AUTO: 8.6 X10(3)/MCL (ref 4.5–11)

## 2021-12-08 LAB
ABS NEUT (OLG): 2.74 X10(3)/MCL (ref 2.1–9.2)
ALBUMIN SERPL-MCNC: 3.8 GM/DL (ref 3.5–5)
ALBUMIN/GLOB SERPL: 1 RATIO (ref 1.1–2)
ALP SERPL-CCNC: 145 UNIT/L (ref 40–150)
ALT SERPL-CCNC: 44 UNIT/L (ref 0–55)
ANISOCYTOSIS BLD QL SMEAR: ABNORMAL
AST SERPL-CCNC: 25 UNIT/L (ref 5–34)
BASOPHILS NFR BLD MANUAL: 0 %
BILIRUB SERPL-MCNC: 0.4 MG/DL
BILIRUBIN DIRECT+TOT PNL SERPL-MCNC: 0.2 MG/DL (ref 0–0.5)
BILIRUBIN DIRECT+TOT PNL SERPL-MCNC: 0.2 MG/DL (ref 0–0.8)
BUN SERPL-MCNC: 6 MG/DL (ref 7–18.7)
CALCIUM SERPL-MCNC: 9.2 MG/DL (ref 8.7–10.5)
CHLORIDE SERPL-SCNC: 104 MMOL/L (ref 98–107)
CHOLEST SERPL-MCNC: 159 MG/DL
CHOLEST/HDLC SERPL: 6 {RATIO} (ref 0–5)
CO2 SERPL-SCNC: 22 MMOL/L (ref 22–29)
CREAT SERPL-MCNC: 0.85 MG/DL (ref 0.55–1.02)
CREAT UR-MCNC: 180.2 MG/DL (ref 45–106)
EOSINOPHIL NFR BLD MANUAL: 4 %
ERYTHROCYTE [DISTWIDTH] IN BLOOD BY AUTOMATED COUNT: 15 % (ref 11.5–14.5)
GLOBULIN SER-MCNC: 3.8 GM/DL (ref 2.4–3.5)
GLUCOSE SERPL-MCNC: 264 MG/DL (ref 74–100)
GRANULOCYTES NFR BLD MANUAL: 37 % (ref 43–75)
HAV IGM SERPL QL IA: NONREACTIVE
HBV CORE IGM SERPL QL IA: NONREACTIVE
HBV SURFACE AG SERPL QL IA: NONREACTIVE
HCT VFR BLD AUTO: 38.3 % (ref 35–46)
HCV AB SERPL QL IA: NONREACTIVE
HDLC SERPL-MCNC: 26 MG/DL (ref 35–60)
HGB BLD-MCNC: 12.5 GM/DL (ref 12–16)
HIV 1+2 AB+HIV1 P24 AG SERPL QL IA: NONREACTIVE
LDLC SERPL CALC-MCNC: 65 MG/DL (ref 50–140)
LYMPHOCYTES NFR BLD MANUAL: 53 % (ref 20.5–51.1)
MCH RBC QN AUTO: 26.4 PG (ref 26–34)
MCHC RBC AUTO-ENTMCNC: 32.6 GM/DL (ref 31–37)
MCV RBC AUTO: 80.8 FL (ref 80–100)
MONOCYTES NFR BLD MANUAL: 6 % (ref 2–9)
PLATELET # BLD AUTO: 539 X10(3)/MCL (ref 130–400)
PLATELET # BLD EST: ABNORMAL 10*3/UL
PMV BLD AUTO: 9.1 FL (ref 7.4–10.4)
POLYCHROMASIA BLD QL SMEAR: ABNORMAL
POTASSIUM SERPL-SCNC: 3.2 MMOL/L (ref 3.5–5.1)
PROT SERPL-MCNC: 7.6 GM/DL (ref 6.4–8.3)
PROT UR STRIP-MCNC: 19.4 MG/DL
PROT/CREAT UR-RTO: 107.7 MG/GM CR
RBC # BLD AUTO: 4.74 X10(6)/MCL (ref 4–5.2)
RBC MORPH BLD: ABNORMAL
RPR SER QL: NORMAL
SODIUM SERPL-SCNC: 136 MMOL/L (ref 136–145)
T PALLIDUM AB SER QL: REACTIVE
TRIGL SERPL-MCNC: 342 MG/DL (ref 37–140)
VLDLC SERPL CALC-MCNC: 68 MG/DL
WBC # SPEC AUTO: 9.1 X10(3)/MCL (ref 4.5–11)

## 2021-12-12 LAB
FINAL CULTURE: NORMAL
FINAL CULTURE: NORMAL

## 2022-04-10 ENCOUNTER — HISTORICAL (OUTPATIENT)
Dept: ADMINISTRATIVE | Facility: HOSPITAL | Age: 27
End: 2022-04-10
Payer: MEDICAID

## 2022-04-30 VITALS
OXYGEN SATURATION: 100 % | DIASTOLIC BLOOD PRESSURE: 98 MMHG | BODY MASS INDEX: 41.02 KG/M2 | HEIGHT: 71 IN | SYSTOLIC BLOOD PRESSURE: 151 MMHG | WEIGHT: 293 LBS
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
## 2022-06-03 DIAGNOSIS — Z01.419 ROUTINE GYNECOLOGICAL EXAMINATION: Primary | ICD-10-CM

## 2022-09-21 ENCOUNTER — HISTORICAL (OUTPATIENT)
Dept: ADMINISTRATIVE | Facility: HOSPITAL | Age: 27
End: 2022-09-21
Payer: MEDICAID

## 2024-04-17 ENCOUNTER — OCCUPATIONAL HEALTH (OUTPATIENT)
Dept: URGENT CARE | Facility: CLINIC | Age: 29
End: 2024-04-17

## 2024-04-17 DIAGNOSIS — Z02.1 PRE-EMPLOYMENT EXAMINATION: Primary | ICD-10-CM

## 2024-04-17 LAB
CTP QC/QA: YES
FECAL OCCULT BLOOD, POC: NEGATIVE

## 2024-04-17 PROCEDURE — 86592 SYPHILIS TEST NON-TREP QUAL: CPT | Mod: S$GLB,,, | Performed by: EMERGENCY MEDICINE

## 2024-04-17 PROCEDURE — 80061 LIPID PANEL: CPT | Mod: QW,S$GLB,, | Performed by: EMERGENCY MEDICINE

## 2024-04-17 PROCEDURE — 99499 UNLISTED E&M SERVICE: CPT | Mod: S$GLB,,, | Performed by: EMERGENCY MEDICINE

## 2024-04-17 PROCEDURE — 97750 PHYSICAL PERFORMANCE TEST: CPT | Mod: S$GLB,,, | Performed by: EMERGENCY MEDICINE

## 2024-04-17 PROCEDURE — 87389 HIV-1 AG W/HIV-1&-2 AB AG IA: CPT | Mod: QW,S$GLB,, | Performed by: EMERGENCY MEDICINE

## 2024-04-17 PROCEDURE — 80305 DRUG TEST PRSMV DIR OPT OBS: CPT | Mod: S$GLB,,, | Performed by: EMERGENCY MEDICINE

## 2024-04-17 PROCEDURE — 80053 COMPREHEN METABOLIC PANEL: CPT | Mod: QW,S$GLB,, | Performed by: EMERGENCY MEDICINE

## 2024-04-17 PROCEDURE — 82977 ASSAY OF GGT: CPT | Mod: QW,S$GLB,, | Performed by: EMERGENCY MEDICINE

## 2024-04-17 PROCEDURE — 85025 COMPLETE CBC W/AUTO DIFF WBC: CPT | Mod: QW,S$GLB,, | Performed by: EMERGENCY MEDICINE

## 2024-04-17 PROCEDURE — 92552 PURE TONE AUDIOMETRY AIR: CPT | Mod: S$GLB,,, | Performed by: EMERGENCY MEDICINE

## 2024-04-17 PROCEDURE — 82270 OCCULT BLOOD FECES: CPT | Mod: S$GLB,,, | Performed by: EMERGENCY MEDICINE

## 2024-04-17 PROCEDURE — 86580 TB INTRADERMAL TEST: CPT | Mod: S$GLB,,, | Performed by: EMERGENCY MEDICINE

## 2024-04-18 ENCOUNTER — TELEPHONE (OUTPATIENT)
Dept: URGENT CARE | Facility: CLINIC | Age: 29
End: 2024-04-18
Payer: MEDICAID

## 2024-04-18 NOTE — TELEPHONE ENCOUNTER
I called the patient to discuss the results of the preemployment labwork.  All results are acceptable.  We discussed any abnormalities and the patient is to follow up with PCP regarding any abnormalities.  The patient verbalized understanding.

## 2024-11-26 ENCOUNTER — HOSPITAL ENCOUNTER (EMERGENCY)
Facility: HOSPITAL | Age: 29
Discharge: HOME | End: 2024-11-27
Attending: STUDENT IN AN ORGANIZED HEALTH CARE EDUCATION/TRAINING PROGRAM

## 2024-11-26 DIAGNOSIS — R73.9 HYPERGLYCEMIA: ICD-10-CM

## 2024-11-26 DIAGNOSIS — R10.9 SUDDEN ONSET OF SEVERE ABDOMINAL PAIN: ICD-10-CM

## 2024-11-26 DIAGNOSIS — K65.4 MESENTERIC PANNICULITIS: ICD-10-CM

## 2024-11-26 DIAGNOSIS — R10.11 RIGHT UPPER QUADRANT PAIN: ICD-10-CM

## 2024-11-26 DIAGNOSIS — R11.2 NAUSEA AND VOMITING, UNSPECIFIED VOMITING TYPE: Primary | ICD-10-CM

## 2024-11-26 DIAGNOSIS — R10.13 EPIGASTRIC PAIN: ICD-10-CM

## 2024-11-26 PROBLEM — I10 PRIMARY HYPERTENSION: Status: ACTIVE | Noted: 2022-08-08

## 2024-11-26 PROBLEM — E66.01 MORBID OBESITY: Status: ACTIVE | Noted: 2022-08-08

## 2024-11-26 PROBLEM — E11.9 DIABETES MELLITUS: Status: ACTIVE | Noted: 2022-08-08

## 2024-11-26 LAB
ALBUMIN SERPL BCP-MCNC: 4.1 G/DL (ref 3.5–5.2)
ALLENS TEST: NORMAL
ALLENS TEST: NORMAL
ALP SERPL-CCNC: 180 U/L (ref 40–150)
ALT SERPL W/O P-5'-P-CCNC: 42 U/L (ref 10–44)
ANION GAP SERPL CALC-SCNC: 9 MMOL/L (ref 8–16)
AST SERPL-CCNC: 26 U/L (ref 10–40)
B-HCG UR QL: NEGATIVE
B-OH-BUTYR BLD STRIP-SCNC: 0.1 MMOL/L (ref 0–0.5)
BACTERIA #/AREA URNS AUTO: ABNORMAL /HPF
BASOPHILS # BLD AUTO: 0.05 K/UL (ref 0–0.2)
BASOPHILS NFR BLD: 0.2 % (ref 0–1.9)
BILIRUB SERPL-MCNC: 0.4 MG/DL (ref 0.1–1)
BILIRUB UR QL STRIP: NEGATIVE
BILIRUB UR QL STRIP: NEGATIVE
BUN SERPL-MCNC: 9 MG/DL (ref 6–20)
BUN SERPL-MCNC: 9 MG/DL (ref 6–30)
CALCIUM SERPL-MCNC: 9.6 MG/DL (ref 8.7–10.5)
CHLORIDE SERPL-SCNC: 105 MMOL/L (ref 95–110)
CHLORIDE SERPL-SCNC: 107 MMOL/L (ref 95–110)
CLARITY UR REFRACT.AUTO: CLEAR
CLARITY UR REFRACT.AUTO: CLEAR
CO2 SERPL-SCNC: 22 MMOL/L (ref 23–29)
COLOR UR AUTO: YELLOW
COLOR UR AUTO: YELLOW
CREAT SERPL-MCNC: 0.7 MG/DL (ref 0.5–1.4)
CREAT SERPL-MCNC: 0.8 MG/DL (ref 0.5–1.4)
CTP QC/QA: YES
DIFFERENTIAL METHOD BLD: ABNORMAL
EOSINOPHIL # BLD AUTO: 0 K/UL (ref 0–0.5)
EOSINOPHIL NFR BLD: 0.1 % (ref 0–8)
ERYTHROCYTE [DISTWIDTH] IN BLOOD BY AUTOMATED COUNT: 15.8 % (ref 11.5–14.5)
EST. GFR  (NO RACE VARIABLE): >60 ML/MIN/1.73 M^2
GLUCOSE SERPL-MCNC: 185 MG/DL (ref 70–110)
GLUCOSE SERPL-MCNC: 193 MG/DL (ref 70–110)
GLUCOSE UR QL STRIP: ABNORMAL
GLUCOSE UR QL STRIP: ABNORMAL
HCO3 UR-SCNC: 25.7 MMOL/L (ref 24–28)
HCT VFR BLD AUTO: 43.7 % (ref 37–48.5)
HCT VFR BLD CALC: 44 %PCV (ref 36–54)
HGB BLD-MCNC: 13.1 G/DL (ref 12–16)
HGB UR QL STRIP: ABNORMAL
HGB UR QL STRIP: ABNORMAL
HYALINE CASTS UR QL AUTO: 1 /LPF
IMM GRANULOCYTES # BLD AUTO: 0.1 K/UL (ref 0–0.04)
IMM GRANULOCYTES NFR BLD AUTO: 0.5 % (ref 0–0.5)
KETONES UR QL STRIP: NEGATIVE
KETONES UR QL STRIP: NEGATIVE
LDH SERPL L TO P-CCNC: 1.97 MMOL/L (ref 0.5–2.2)
LEUKOCYTE ESTERASE UR QL STRIP: NEGATIVE
LEUKOCYTE ESTERASE UR QL STRIP: NEGATIVE
LIPASE SERPL-CCNC: 18 U/L (ref 4–60)
LYMPHOCYTES # BLD AUTO: 1.9 K/UL (ref 1–4.8)
LYMPHOCYTES NFR BLD: 9.1 % (ref 18–48)
MCH RBC QN AUTO: 23.6 PG (ref 27–31)
MCHC RBC AUTO-ENTMCNC: 30 G/DL (ref 32–36)
MCV RBC AUTO: 79 FL (ref 82–98)
MICROSCOPIC COMMENT: ABNORMAL
MONOCYTES # BLD AUTO: 0.9 K/UL (ref 0.3–1)
MONOCYTES NFR BLD: 4.2 % (ref 4–15)
NEUTROPHILS # BLD AUTO: 17.5 K/UL (ref 1.8–7.7)
NEUTROPHILS NFR BLD: 85.9 % (ref 38–73)
NITRITE UR QL STRIP: NEGATIVE
NITRITE UR QL STRIP: NEGATIVE
NRBC BLD-RTO: 0 /100 WBC
PCO2 BLDA: 42.5 MMHG (ref 35–45)
PH SMN: 7.39 [PH] (ref 7.35–7.45)
PH UR STRIP: 6 [PH] (ref 5–8)
PH UR STRIP: 6 [PH] (ref 5–8)
PLATELET # BLD AUTO: 481 K/UL (ref 150–450)
PMV BLD AUTO: 8.8 FL (ref 9.2–12.9)
PO2 BLDA: 52 MMHG (ref 40–60)
POC BE: 1 MMOL/L
POC IONIZED CALCIUM: 1.14 MMOL/L (ref 1.06–1.42)
POC SATURATED O2: 86 % (ref 95–100)
POC TCO2 (MEASURED): 24 MMOL/L (ref 23–29)
POC TCO2: 27 MMOL/L (ref 24–29)
POTASSIUM BLD-SCNC: 3.8 MMOL/L (ref 3.5–5.1)
POTASSIUM SERPL-SCNC: 3.9 MMOL/L (ref 3.5–5.1)
PROT SERPL-MCNC: 8.7 G/DL (ref 6–8.4)
PROT UR QL STRIP: ABNORMAL
PROT UR QL STRIP: ABNORMAL
RBC # BLD AUTO: 5.54 M/UL (ref 4–5.4)
RBC #/AREA URNS AUTO: >100 /HPF (ref 0–4)
SAMPLE: ABNORMAL
SAMPLE: NORMAL
SAMPLE: NORMAL
SITE: NORMAL
SITE: NORMAL
SODIUM BLD-SCNC: 141 MMOL/L (ref 136–145)
SODIUM SERPL-SCNC: 138 MMOL/L (ref 136–145)
SP GR UR STRIP: >=1.03 (ref 1–1.03)
SP GR UR STRIP: >=1.03 (ref 1–1.03)
SQUAMOUS #/AREA URNS AUTO: 2 /HPF
URN SPEC COLLECT METH UR: ABNORMAL
URN SPEC COLLECT METH UR: ABNORMAL
WBC # BLD AUTO: 20.39 K/UL (ref 3.9–12.7)
WBC #/AREA URNS AUTO: 5 /HPF (ref 0–5)
YEAST UR QL AUTO: ABNORMAL

## 2024-11-26 PROCEDURE — 25000003 PHARM REV CODE 250: Performed by: STUDENT IN AN ORGANIZED HEALTH CARE EDUCATION/TRAINING PROGRAM

## 2024-11-26 PROCEDURE — 80047 BASIC METABLC PNL IONIZED CA: CPT

## 2024-11-26 PROCEDURE — 93005 ELECTROCARDIOGRAM TRACING: CPT

## 2024-11-26 PROCEDURE — 81025 URINE PREGNANCY TEST: CPT | Performed by: STUDENT IN AN ORGANIZED HEALTH CARE EDUCATION/TRAINING PROGRAM

## 2024-11-26 PROCEDURE — 82803 BLOOD GASES ANY COMBINATION: CPT

## 2024-11-26 PROCEDURE — 96374 THER/PROPH/DIAG INJ IV PUSH: CPT

## 2024-11-26 PROCEDURE — 63600175 PHARM REV CODE 636 W HCPCS: Performed by: STUDENT IN AN ORGANIZED HEALTH CARE EDUCATION/TRAINING PROGRAM

## 2024-11-26 PROCEDURE — 93010 ELECTROCARDIOGRAM REPORT: CPT | Mod: ,,, | Performed by: INTERNAL MEDICINE

## 2024-11-26 PROCEDURE — 85025 COMPLETE CBC W/AUTO DIFF WBC: CPT | Performed by: STUDENT IN AN ORGANIZED HEALTH CARE EDUCATION/TRAINING PROGRAM

## 2024-11-26 PROCEDURE — 99285 EMERGENCY DEPT VISIT HI MDM: CPT | Mod: 25

## 2024-11-26 PROCEDURE — 83690 ASSAY OF LIPASE: CPT | Performed by: STUDENT IN AN ORGANIZED HEALTH CARE EDUCATION/TRAINING PROGRAM

## 2024-11-26 PROCEDURE — 99900035 HC TECH TIME PER 15 MIN (STAT)

## 2024-11-26 PROCEDURE — 83605 ASSAY OF LACTIC ACID: CPT

## 2024-11-26 PROCEDURE — 82010 KETONE BODYS QUAN: CPT | Performed by: STUDENT IN AN ORGANIZED HEALTH CARE EDUCATION/TRAINING PROGRAM

## 2024-11-26 PROCEDURE — 96375 TX/PRO/DX INJ NEW DRUG ADDON: CPT

## 2024-11-26 PROCEDURE — 80053 COMPREHEN METABOLIC PANEL: CPT | Performed by: STUDENT IN AN ORGANIZED HEALTH CARE EDUCATION/TRAINING PROGRAM

## 2024-11-26 PROCEDURE — 96361 HYDRATE IV INFUSION ADD-ON: CPT

## 2024-11-26 PROCEDURE — 81001 URINALYSIS AUTO W/SCOPE: CPT | Performed by: STUDENT IN AN ORGANIZED HEALTH CARE EDUCATION/TRAINING PROGRAM

## 2024-11-26 PROCEDURE — 94761 N-INVAS EAR/PLS OXIMETRY MLT: CPT

## 2024-11-26 RX ORDER — MORPHINE SULFATE 4 MG/ML
4 INJECTION, SOLUTION INTRAMUSCULAR; INTRAVENOUS
Status: COMPLETED | OUTPATIENT
Start: 2024-11-26 | End: 2024-11-26

## 2024-11-26 RX ORDER — ONDANSETRON HYDROCHLORIDE 2 MG/ML
4 INJECTION, SOLUTION INTRAVENOUS
Status: COMPLETED | OUTPATIENT
Start: 2024-11-26 | End: 2024-11-26

## 2024-11-26 RX ORDER — DIPHENHYDRAMINE HYDROCHLORIDE 50 MG/ML
12.5 INJECTION INTRAMUSCULAR; INTRAVENOUS
Status: COMPLETED | OUTPATIENT
Start: 2024-11-26 | End: 2024-11-26

## 2024-11-26 RX ADMIN — MORPHINE SULFATE 4 MG: 4 INJECTION INTRAVENOUS at 11:11

## 2024-11-26 RX ADMIN — ONDANSETRON 4 MG: 2 INJECTION INTRAMUSCULAR; INTRAVENOUS at 11:11

## 2024-11-26 RX ADMIN — SODIUM CHLORIDE 1000 ML: 9 INJECTION, SOLUTION INTRAVENOUS at 11:11

## 2024-11-26 RX ADMIN — DIPHENHYDRAMINE HYDROCHLORIDE 12.5 MG: 50 INJECTION, SOLUTION INTRAMUSCULAR; INTRAVENOUS at 11:11

## 2024-11-27 VITALS
HEART RATE: 66 BPM | DIASTOLIC BLOOD PRESSURE: 90 MMHG | BODY MASS INDEX: 41.02 KG/M2 | SYSTOLIC BLOOD PRESSURE: 169 MMHG | HEIGHT: 71 IN | WEIGHT: 293 LBS | RESPIRATION RATE: 20 BRPM | TEMPERATURE: 98 F | OXYGEN SATURATION: 99 %

## 2024-11-27 LAB
OHS QRS DURATION: 108 MS
OHS QTC CALCULATION: 484 MS

## 2024-11-27 PROCEDURE — 96361 HYDRATE IV INFUSION ADD-ON: CPT

## 2024-11-27 PROCEDURE — 25500020 PHARM REV CODE 255: Performed by: STUDENT IN AN ORGANIZED HEALTH CARE EDUCATION/TRAINING PROGRAM

## 2024-11-27 RX ORDER — ONDANSETRON 4 MG/1
4 TABLET, ORALLY DISINTEGRATING ORAL EVERY 8 HOURS PRN
Qty: 12 TABLET | Refills: 0 | Status: SHIPPED | OUTPATIENT
Start: 2024-11-27

## 2024-11-27 RX ADMIN — IOHEXOL 100 ML: 350 INJECTION, SOLUTION INTRAVENOUS at 12:11

## 2024-11-27 NOTE — ED PROVIDER NOTES
"Encounter Date: 11/26/2024       History     Chief Complaint   Patient presents with    Nausea     vomiting    Vomiting     Sudden onset n/v/d. [facility].      29-year-old female with PMH of hypertension and prediabetes presents with abdominal pain.  Patient got back from a cruise 2 days ago.  States that she had acute onset nausea and nonbloody vomiting earlier this evening.  Also reports diffuse abdominal pain that is worse "at the top".  Denies history of similar symptoms.  The pain is severe and constant, nonradiating.  No chest pain, shortness of breath, fever, dysuria, hematuria, and black/bloody stools.  Patient reports 1 episode of nonbloody diarrhea today.  Reports increased alcohol intake during the cruise.  Denies recreational drug use including marijuana.    Per patient's family member who is bedside patient did have some vomiting before the cruise as well.    The history is provided by the patient and a relative.     Review of patient's allergies indicates:  No Known Allergies  Past Medical History:   Diagnosis Date    Anemia     Hypertension      History reviewed. No pertinent surgical history.  Family History   Problem Relation Name Age of Onset    Hypertension Sister      Cancer Maternal Grandmother      Diabetes Maternal Grandmother       Social History     Tobacco Use    Smoking status: Every Day     Types: Cigarettes    Smokeless tobacco: Never    Tobacco comments:     Few cigarettes a day   Substance Use Topics    Alcohol use: Yes     Comment: occassinal    Drug use: No     Review of Systems    Physical Exam     Initial Vitals [11/26/24 1927]   BP Pulse Resp Temp SpO2   (!) 154/104 74 18 98 °F (36.7 °C) 97 %      MAP       --         Physical Exam    Constitutional: Vital signs are normal. She appears well-developed and well-nourished. No distress.   Generally weak appearing.  Speaks softly in his somewhat uncooperative with the exam and interview   HENT: "   Head: Normocephalic and atraumatic.   Eyes: Conjunctivae are normal.   Cardiovascular:  Normal rate and intact distal pulses.           Pulmonary/Chest: No respiratory distress.   No increased work of breathing and tachypnea   Abdominal:   Soft, nondistended abdomen with significant tenderness to palpation epigastrically, right upper quadrant, and right lower quadrant with voluntary guarding     Neurological: She is alert and oriented to person, place, and time. GCS eye subscore is 4. GCS verbal subscore is 5. GCS motor subscore is 6.   Skin: Skin is warm. Capillary refill takes less than 2 seconds.         ED Course   Procedures  Labs Reviewed   CBC W/ AUTO DIFFERENTIAL - Abnormal       Result Value    WBC 20.39 (*)     RBC 5.54 (*)     Hemoglobin 13.1      Hematocrit 43.7      MCV 79 (*)     MCH 23.6 (*)     MCHC 30.0 (*)     RDW 15.8 (*)     Platelets 481 (*)     MPV 8.8 (*)     Immature Granulocytes 0.5      Gran # (ANC) 17.5 (*)     Immature Grans (Abs) 0.10 (*)     Lymph # 1.9      Mono # 0.9      Eos # 0.0      Baso # 0.05      nRBC 0      Gran % 85.9 (*)     Lymph % 9.1 (*)     Mono % 4.2      Eosinophil % 0.1      Basophil % 0.2      Differential Method Automated     COMPREHENSIVE METABOLIC PANEL - Abnormal    Sodium 138      Potassium 3.9      Chloride 107      CO2 22 (*)     Glucose 185 (*)     BUN 9      Creatinine 0.8      Calcium 9.6      Total Protein 8.7 (*)     Albumin 4.1      Total Bilirubin 0.4      Alkaline Phosphatase 180 (*)     AST 26      ALT 42      eGFR >60.0      Anion Gap 9     URINALYSIS, REFLEX TO URINE CULTURE - Abnormal    Specimen UA Urine, Clean Catch      Color, UA Yellow      Appearance, UA Clear      pH, UA 6.0      Specific Gravity, UA >=1.030 (*)     Protein, UA 1+ (*)     Glucose, UA 3+ (*)     Ketones, UA Negative      Bilirubin (UA) Negative      Occult Blood UA 3+ (*)     Nitrite, UA Negative      Leukocytes, UA Negative      Narrative:     Specimen Source->Urine    URINALYSIS, REFLEX TO URINE CULTURE - Abnormal    Specimen UA Urine, Clean Catch      Color, UA Yellow      Appearance, UA Clear      pH, UA 6.0      Specific Gravity, UA >=1.030 (*)     Protein, UA 1+ (*)     Glucose, UA 3+ (*)     Ketones, UA Negative      Bilirubin (UA) Negative      Occult Blood UA 3+ (*)     Nitrite, UA Negative      Leukocytes, UA Negative      Narrative:     Specimen Source->Urine   URINALYSIS MICROSCOPIC - Abnormal    RBC, UA >100 (*)     WBC, UA 5      Bacteria Rare      Yeast, UA Rare (*)     Squam Epithel, UA 2      Hyaline Casts, UA 1      Microscopic Comment SEE COMMENT      Narrative:     Specimen Source->Urine   ISTAT PROCEDURE - Abnormal    POC Glucose 193 (*)     POC BUN 9      POC Creatinine 0.7      POC Sodium 141      POC Potassium 3.8      POC Chloride 105      POC TCO2 (MEASURED) 24      POC Ionized Calcium 1.14      POC Hematocrit 44      Sample NIKITA     LIPASE    Lipase 18     BETA - HYDROXYBUTYRATE, SERUM    Beta-Hydroxybutyrate 0.1     POCT URINE PREGNANCY    POC Preg Test, Ur Negative       Acceptable Yes     ISTAT LACTATE    POC Lactate 1.97      Sample VENOUS      Site Other      Allens Test N/A     ISTAT PROCEDURE    POC PH 7.390      POC PCO2 42.5      POC PO2 52      POC HCO3 25.7      POC BE 1      POC SATURATED O2 86      POC TCO2 27      Sample VENOUS      Site Other      Allens Test N/A     ISTAT CHEM8   POCT GLUCOSE MONITORING CONTINUOUS   ISTAT CHEM8          Imaging Results              CT Abdomen Pelvis With IV Contrast NO Oral Contrast (Final result)  Result time 11/27/24 00:52:14      Final result by Tomás Cedeno MD (11/27/24 00:52:14)                   Impression:      Mildly prominent central abdominal mesenteric lymph nodes, nonspecific, may relate to a baseline appearance, may be reactive, can be seen with mesenteric panniculitis or a lymphoproliferative process, clinical and historical correlation is otherwise needed.    There is  no additional evidence for acute inflammatory or obstructive process of the abdomen or pelvis.      Electronically signed by: Tomás Cedeno  Date:    11/27/2024  Time:    00:52               Narrative:    EXAMINATION:  CT ABDOMEN PELVIS WITH IV CONTRAST    CLINICAL HISTORY:  Nausea/vomiting;Pancreatitis, acute, severe;Epigastric pain;    TECHNIQUE:  Low dose axial images, sagittal and coronal reformations were obtained from the lung bases to the pubic symphysis following the IV administration of 100 mL of Omnipaque 350 oral contrast was not utilized.  Single phase postcontrast CT examination of the abdomen and pelvis is submitted.    COMPARISON:  None.    FINDINGS:  The visualized lung bases demonstrate appearance of mild diminished depth of inspiration and atelectatic change.  There is mild air seen within the stomach, nonspecific appearance.    Mild diminished attenuation of the liver may relate to mild diffuse fatty infiltrate.  There is no evidence for pericholecystic or peripancreatic inflammatory change, there is no evidence for abnormal pancreatic or biliary ductal dilatation.  There is no evidence for acute process of the spleen or adrenal glands.    There is no evidence for ureteral calculus or obstructive uropathy or perinephric inflammatory change bilaterally.  The abdominal aorta appears normal in caliber, demonstrates appropriate opacification.  Small nonenlarged periaortic retroperitoneal lymph nodes are noted.    The urinary bladder is incompletely distended, appears unremarkable for degree of distention.  There is no evidence for acute process of the uterus or adnexa.    There are mildly prominent central abdominal mesenteric lymph nodes, nonspecific appearance, may relate to a baseline appearance, may be reactive, can be seen with mesenteric panniculitis or a lymphoproliferative process.    There is no evidence for small bowel obstructive process.  The appendix is difficult to delineate, there is a  small thin structure thought to represent the appendix, it does not appear inflamed, not seen in its entirety.  There is no evidence for inflammatory or obstructive process of the colon.  There is no evidence for free intraperitoneal air.    The visualized osseous structures appear intact.  Chronic change noted.                                       X-Ray Chest AP Portable (Final result)  Result time 11/27/24 00:16:50      Final result by Kofi Barraza MD (11/27/24 00:16:50)                   Impression:      No acute abnormality.      Electronically signed by: Kofi Barraza  Date:    11/27/2024  Time:    00:16               Narrative:    EXAMINATION:  XR CHEST AP PORTABLE    CLINICAL HISTORY:  hyperglycemia;    TECHNIQUE:  Single frontal view of the chest was performed.    COMPARISON:  12/06/2021    FINDINGS:  The lungs are clear, with normal appearance of pulmonary vasculature and no pleural effusion or pneumothorax.    The cardiac silhouette is normal in size. The hilar and mediastinal contours are unremarkable.    Bones are intact.                                       Medications   sodium chloride 0.9% bolus 1,000 mL 1,000 mL (0 mLs Intravenous Stopped 11/27/24 0114)   morphine injection 4 mg (4 mg Intravenous Given 11/26/24 2334)   ondansetron injection 4 mg (4 mg Intravenous Given 11/26/24 2335)   diphenhydrAMINE injection 12.5 mg (12.5 mg Intravenous Given 11/26/24 2341)   iohexoL (OMNIPAQUE 350) injection 100 mL (100 mLs Intravenous Given 11/27/24 0006)     Medical Decision Making  Differential diagnoses considered includes DKA, cholecystitis, pancreatitis, appendicitis, PUD/gastritis, UTI, electrolyte abnormality, WILLIE    See ED course for additional attending MDM.     Amount and/or Complexity of Data Reviewed  Labs: ordered.  Radiology: ordered.  ECG/medicine tests:  Decision-making details documented in ED Course.    Risk  Prescription drug management.               ED Course as of 11/27/24 0119   Tue  Nov 26, 2024 2018 EKG 12-lead  EKG independently interpreted by me shows normal sinus rhythm, rate 88, no STEMI, minimal ST elevation in V1 and V2 without reciprocal depressions, borderline prolonged QT at 484. Stable compared to 11/2020 [BD]   2339 RBC, UA(!): >100  Pt reports she's currently on her period [BD]   2339 Urinalysis, Reflex to Urine Culture Urine, Clean Catch(!)  No UTI [BD]   2339 Beta-Hydroxybutyrate: 0.1 [BD]   2339 Comp. Metabolic Panel(!)  CMP unremarkable without significant electrolyte derangement, impaired renal function, or elevated LFTs   [BD]   2339 WBC(!): 20.39  Significantly elevated WBC, no clear etiology. Normal vitals initially. No obvious infectious source currently. [BD]   Wed Nov 27, 2024 0030 X-Ray Chest AP Portable  Chest x-ray independently interpreted by me shows no acute process such as pneumonia, pneumothorax, or pulmonary edema.    [BD]   0055 CT Abdomen Pelvis With IV Contrast NO Oral Contrast  Possible mesenteric panniculitis. Supportive treatment [BD]   0057 Pt's workup non-acute. Potentially has gastroenteritis from her cruise versus the mesenteric panniculitis seen on CT. Pt feeling better currently and has tolerated PO. Hemodynamically stable. Patient will be discharged at this time. Patient has been given home care instructions, follow up instructions, and strict return precautions. They agree with and are comfortable with the plan. Rx'd zofran PRN for nausea.  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 Provider     ondansetron (ZOFRAN-ODT) 4 MG TbDL DISSOLVE 1 tablet (4 mg total) by mouth every 8 (eight) hours as needed (nausea). 12 tablet 11/27/2024 -- Jerrod Finnegan MD          Follow-up Information       Follow up With Specialties Details Why Contact Info    Stacey Scherer MD Internal Medicine Schedule an appointment as soon as possible for a visit  To discuss your recent ER visit and any additional concerns that you may have [address]  [address]  [facility] [id number] [phone]      [facility] - Emergency Dept Emergency Medicine Go to  As needed, If symptoms worsen [address] [address] [phone]             Jerrod Finnegan MD  11/27/24 0119

## 2024-11-27 NOTE — ED TRIAGE NOTES
Lisbeth Moran, a 29 y.o. female presents to the ED w/ complaint of     Triage note:  Chief Complaint   Patient presents with    Nausea     vomiting    Vomiting     Sudden onset n/v/d. Just came back from [facility] yesterday.      Review of patient's allergies indicates:  No Known Allergies  Past Medical History:   Diagnosis Date    Anemia     Hypertension        LOC: The patient is awake, alert, aware of environment with an appropriate affect. Oriented x4, speaking appropriately  APPEARANCE: Pt resting comfortably, in no acute distress, pt is clean and well groomed, clothing properly fastened  SKIN:The skin is warm and dry, color consistent with ethnicity, patient has normal skin turgor and moist mucus membranes, no bruising noted   RESPIRATORY:Airway is open and patent, respirations are spontaneous, patient has a normal effort and rate, no accessory muscle use noted.  CARDIAC: Normal rate and rhythm, no peripheral edema noted, capillary refill < 3 seconds, bilateral radial pulses 2+.  ABDOMEN: Soft, non tender, non distended. Bowel sounds present x 4 quadrants. +n/v  NEUROLOGIC: PERRLA, facial expression is symmetrical, patient moving all extremities spontaneously, normal sensation in all extremities when touched with a finger.  Follows all commands appropriately  MUSCULOSKELETAL: Patient moving all extremities spontaneously, no obvious swelling or deformities noted.

## 2024-11-27 NOTE — DISCHARGE INSTRUCTIONS
It was a pleasure taking care of you today!     Home Care:   - You may take Zofran 4mg (dissolves under tongue) every 6 hours for nausea/vomiting    Follow-Up Plan:  - Follow-up with primary care doctor within 3 - 5 days to discuss your recent ER visit and any additional concerns that you may have.  - Additional testing and/or evaluation as directed by your primary doctor    Return to the Emergency Department for symptoms including but not limited to: persistence or worsening of symptoms, shortness of breath or chest pain, inability to drink without vomiting, passing out/fainting/ loss of consciousness, or if you have other concerns.

## 2024-12-22 ENCOUNTER — HOSPITAL ENCOUNTER (INPATIENT)
Facility: HOSPITAL | Age: 29
LOS: 2 days | Discharge: HOME | DRG: 638 | End: 2024-12-24
Attending: EMERGENCY MEDICINE | Admitting: STUDENT IN AN ORGANIZED HEALTH CARE EDUCATION/TRAINING PROGRAM

## 2024-12-22 DIAGNOSIS — R73.9 HYPERGLYCEMIA: ICD-10-CM

## 2024-12-22 DIAGNOSIS — R07.9 CHEST PAIN: ICD-10-CM

## 2024-12-22 DIAGNOSIS — E11.10 DIABETIC KETOACIDOSIS WITHOUT COMA ASSOCIATED WITH TYPE 2 DIABETES MELLITUS: Primary | ICD-10-CM

## 2024-12-22 DIAGNOSIS — E11.65 TYPE 2 DIABETES MELLITUS WITH HYPERGLYCEMIA, WITHOUT LONG-TERM CURRENT USE OF INSULIN: ICD-10-CM

## 2024-12-22 PROBLEM — E28.2 PCOS (POLYCYSTIC OVARIAN SYNDROME): Status: ACTIVE | Noted: 2024-12-22

## 2024-12-22 LAB
ALBUMIN SERPL BCP-MCNC: 3.8 G/DL (ref 3.5–5.2)
ALLENS TEST: ABNORMAL
ALP SERPL-CCNC: 203 U/L (ref 40–150)
ALT SERPL W/O P-5'-P-CCNC: 34 U/L (ref 10–44)
ANION GAP SERPL CALC-SCNC: 12 MMOL/L (ref 8–16)
ANION GAP SERPL CALC-SCNC: 12 MMOL/L (ref 8–16)
ANION GAP SERPL CALC-SCNC: 15 MMOL/L (ref 8–16)
ANION GAP SERPL CALC-SCNC: 17 MMOL/L (ref 8–16)
AST SERPL-CCNC: 19 U/L (ref 10–40)
B-HCG UR QL: NEGATIVE
B-OH-BUTYR BLD STRIP-SCNC: 2.7 MMOL/L (ref 0–0.5)
BACTERIA #/AREA URNS AUTO: NORMAL /HPF
BASOPHILS # BLD AUTO: 0.01 K/UL (ref 0–0.2)
BASOPHILS NFR BLD: 0.1 % (ref 0–1.9)
BILIRUB SERPL-MCNC: 0.3 MG/DL (ref 0.1–1)
BILIRUB UR QL STRIP: NEGATIVE
BUN SERPL-MCNC: 11 MG/DL (ref 6–20)
BUN SERPL-MCNC: 8 MG/DL (ref 6–20)
BUN SERPL-MCNC: 8 MG/DL (ref 6–20)
BUN SERPL-MCNC: 9 MG/DL (ref 6–20)
CALCIUM SERPL-MCNC: 8.4 MG/DL (ref 8.7–10.5)
CALCIUM SERPL-MCNC: 8.9 MG/DL (ref 8.7–10.5)
CALCIUM SERPL-MCNC: 9.1 MG/DL (ref 8.7–10.5)
CALCIUM SERPL-MCNC: 9.3 MG/DL (ref 8.7–10.5)
CHLORIDE SERPL-SCNC: 103 MMOL/L (ref 95–110)
CHLORIDE SERPL-SCNC: 105 MMOL/L (ref 95–110)
CHLORIDE SERPL-SCNC: 98 MMOL/L (ref 95–110)
CHLORIDE SERPL-SCNC: 99 MMOL/L (ref 95–110)
CLARITY UR REFRACT.AUTO: CLEAR
CO2 SERPL-SCNC: 17 MMOL/L (ref 23–29)
CO2 SERPL-SCNC: 18 MMOL/L (ref 23–29)
CO2 SERPL-SCNC: 20 MMOL/L (ref 23–29)
CO2 SERPL-SCNC: 21 MMOL/L (ref 23–29)
COLOR UR AUTO: COLORLESS
CREAT SERPL-MCNC: 0.9 MG/DL (ref 0.5–1.4)
CREAT SERPL-MCNC: 0.9 MG/DL (ref 0.5–1.4)
CREAT SERPL-MCNC: 1 MG/DL (ref 0.5–1.4)
CREAT SERPL-MCNC: 1.1 MG/DL (ref 0.5–1.4)
CTP QC/QA: YES
DIFFERENTIAL METHOD BLD: ABNORMAL
EOSINOPHIL # BLD AUTO: 0.3 K/UL (ref 0–0.5)
EOSINOPHIL NFR BLD: 3.2 % (ref 0–8)
ERYTHROCYTE [DISTWIDTH] IN BLOOD BY AUTOMATED COUNT: 16.6 % (ref 11.5–14.5)
EST. GFR  (NO RACE VARIABLE): >60 ML/MIN/1.73 M^2
ESTIMATED AVG GLUCOSE: 298 MG/DL (ref 68–131)
GLUCOSE SERPL-MCNC: 129 MG/DL (ref 70–110)
GLUCOSE SERPL-MCNC: 224 MG/DL (ref 70–110)
GLUCOSE SERPL-MCNC: 432 MG/DL (ref 70–110)
GLUCOSE SERPL-MCNC: 434 MG/DL (ref 70–110)
GLUCOSE UR QL STRIP: ABNORMAL
HBA1C MFR BLD: 12 % (ref 4–5.6)
HCO3 UR-SCNC: 21.8 MMOL/L (ref 24–28)
HCT VFR BLD AUTO: 39.6 % (ref 37–48.5)
HGB BLD-MCNC: 12.3 G/DL (ref 12–16)
HGB UR QL STRIP: NEGATIVE
IMM GRANULOCYTES # BLD AUTO: 0.01 K/UL (ref 0–0.04)
IMM GRANULOCYTES NFR BLD AUTO: 0.1 % (ref 0–0.5)
KETONES UR QL STRIP: ABNORMAL
LACTATE SERPL-SCNC: 1 MMOL/L (ref 0.5–2.2)
LEUKOCYTE ESTERASE UR QL STRIP: NEGATIVE
LYMPHOCYTES # BLD AUTO: 4.3 K/UL (ref 1–4.8)
LYMPHOCYTES NFR BLD: 54.4 % (ref 18–48)
MAGNESIUM SERPL-MCNC: 1.7 MG/DL (ref 1.6–2.6)
MAGNESIUM SERPL-MCNC: 1.8 MG/DL (ref 1.6–2.6)
MCH RBC QN AUTO: 24.6 PG (ref 27–31)
MCHC RBC AUTO-ENTMCNC: 31.1 G/DL (ref 32–36)
MCV RBC AUTO: 79 FL (ref 82–98)
MICROSCOPIC COMMENT: NORMAL
MONOCYTES # BLD AUTO: 0.6 K/UL (ref 0.3–1)
MONOCYTES NFR BLD: 7.6 % (ref 4–15)
NEUTROPHILS # BLD AUTO: 2.7 K/UL (ref 1.8–7.7)
NEUTROPHILS NFR BLD: 34.6 % (ref 38–73)
NITRITE UR QL STRIP: NEGATIVE
NRBC BLD-RTO: 0 /100 WBC
OHS QRS DURATION: 108 MS
OHS QTC CALCULATION: 454 MS
PCO2 BLDA: 45.1 MMHG (ref 35–45)
PH SMN: 7.29 [PH] (ref 7.35–7.45)
PH UR STRIP: 5 [PH] (ref 5–8)
PHOSPHATE SERPL-MCNC: 2.2 MG/DL (ref 2.7–4.5)
PHOSPHATE SERPL-MCNC: 2.9 MG/DL (ref 2.7–4.5)
PLATELET # BLD AUTO: 447 K/UL (ref 150–450)
PMV BLD AUTO: 9.8 FL (ref 9.2–12.9)
PO2 BLDA: 45 MMHG (ref 40–60)
POC BE: -5 MMOL/L
POC SATURATED O2: 75 % (ref 95–100)
POC TCO2: 23 MMOL/L (ref 24–29)
POCT GLUCOSE: 130 MG/DL (ref 70–110)
POCT GLUCOSE: 144 MG/DL (ref 70–110)
POCT GLUCOSE: 146 MG/DL (ref 70–110)
POCT GLUCOSE: 152 MG/DL (ref 70–110)
POCT GLUCOSE: 161 MG/DL (ref 70–110)
POCT GLUCOSE: 177 MG/DL (ref 70–110)
POCT GLUCOSE: 181 MG/DL (ref 70–110)
POCT GLUCOSE: 232 MG/DL (ref 70–110)
POCT GLUCOSE: 241 MG/DL (ref 70–110)
POCT GLUCOSE: >500 MG/DL (ref 70–110)
POCT GLUCOSE: >500 MG/DL (ref 70–110)
POTASSIUM SERPL-SCNC: 3.1 MMOL/L (ref 3.5–5.1)
POTASSIUM SERPL-SCNC: 3.6 MMOL/L (ref 3.5–5.1)
POTASSIUM SERPL-SCNC: 3.7 MMOL/L (ref 3.5–5.1)
POTASSIUM SERPL-SCNC: 4.2 MMOL/L (ref 3.5–5.1)
PROT SERPL-MCNC: 8.1 G/DL (ref 6–8.4)
PROT UR QL STRIP: NEGATIVE
RBC # BLD AUTO: 5.01 M/UL (ref 4–5.4)
RBC #/AREA URNS AUTO: 1 /HPF (ref 0–4)
SAMPLE: ABNORMAL
SITE: ABNORMAL
SODIUM SERPL-SCNC: 131 MMOL/L (ref 136–145)
SODIUM SERPL-SCNC: 133 MMOL/L (ref 136–145)
SODIUM SERPL-SCNC: 136 MMOL/L (ref 136–145)
SODIUM SERPL-SCNC: 137 MMOL/L (ref 136–145)
SP GR UR STRIP: >1.03 (ref 1–1.03)
SQUAMOUS #/AREA URNS AUTO: 1 /HPF
URN SPEC COLLECT METH UR: ABNORMAL
WBC # BLD AUTO: 7.87 K/UL (ref 3.9–12.7)
WBC #/AREA URNS AUTO: 0 /HPF (ref 0–5)
YEAST UR QL AUTO: NORMAL

## 2024-12-22 PROCEDURE — S5010 5% DEXTROSE AND 0.45% SALINE: HCPCS

## 2024-12-22 PROCEDURE — 25000003 PHARM REV CODE 250

## 2024-12-22 PROCEDURE — 93010 ELECTROCARDIOGRAM REPORT: CPT | Mod: ,,, | Performed by: INTERNAL MEDICINE

## 2024-12-22 PROCEDURE — 84100 ASSAY OF PHOSPHORUS: CPT

## 2024-12-22 PROCEDURE — 84100 ASSAY OF PHOSPHORUS: CPT | Mod: 91

## 2024-12-22 PROCEDURE — 80048 BASIC METABOLIC PNL TOTAL CA: CPT

## 2024-12-22 PROCEDURE — 25000003 PHARM REV CODE 250: Performed by: STUDENT IN AN ORGANIZED HEALTH CARE EDUCATION/TRAINING PROGRAM

## 2024-12-22 PROCEDURE — 63600175 PHARM REV CODE 636 W HCPCS

## 2024-12-22 PROCEDURE — 82962 GLUCOSE BLOOD TEST: CPT

## 2024-12-22 PROCEDURE — 80053 COMPREHEN METABOLIC PANEL: CPT | Performed by: EMERGENCY MEDICINE

## 2024-12-22 PROCEDURE — 93005 ELECTROCARDIOGRAM TRACING: CPT

## 2024-12-22 PROCEDURE — 63600175 PHARM REV CODE 636 W HCPCS: Performed by: STUDENT IN AN ORGANIZED HEALTH CARE EDUCATION/TRAINING PROGRAM

## 2024-12-22 PROCEDURE — 81001 URINALYSIS AUTO W/SCOPE: CPT | Performed by: EMERGENCY MEDICINE

## 2024-12-22 PROCEDURE — 83735 ASSAY OF MAGNESIUM: CPT

## 2024-12-22 PROCEDURE — 99900035 HC TECH TIME PER 15 MIN (STAT)

## 2024-12-22 PROCEDURE — 36415 COLL VENOUS BLD VENIPUNCTURE: CPT

## 2024-12-22 PROCEDURE — 81025 URINE PREGNANCY TEST: CPT | Performed by: EMERGENCY MEDICINE

## 2024-12-22 PROCEDURE — 96360 HYDRATION IV INFUSION INIT: CPT

## 2024-12-22 PROCEDURE — 83036 HEMOGLOBIN GLYCOSYLATED A1C: CPT

## 2024-12-22 PROCEDURE — 83605 ASSAY OF LACTIC ACID: CPT

## 2024-12-22 PROCEDURE — 85025 COMPLETE CBC W/AUTO DIFF WBC: CPT | Performed by: EMERGENCY MEDICINE

## 2024-12-22 PROCEDURE — 99285 EMERGENCY DEPT VISIT HI MDM: CPT | Mod: 25

## 2024-12-22 PROCEDURE — 20600001 HC STEP DOWN PRIVATE ROOM

## 2024-12-22 PROCEDURE — 82010 KETONE BODYS QUAN: CPT | Performed by: EMERGENCY MEDICINE

## 2024-12-22 PROCEDURE — 82803 BLOOD GASES ANY COMBINATION: CPT

## 2024-12-22 PROCEDURE — 83735 ASSAY OF MAGNESIUM: CPT | Mod: 91

## 2024-12-22 RX ORDER — ONDANSETRON HYDROCHLORIDE 2 MG/ML
4 INJECTION, SOLUTION INTRAVENOUS EVERY 6 HOURS PRN
Status: DISCONTINUED | OUTPATIENT
Start: 2024-12-22 | End: 2024-12-24 | Stop reason: HOSPADM

## 2024-12-22 RX ORDER — INSULIN ASPART 100 [IU]/ML
0-5 INJECTION, SOLUTION INTRAVENOUS; SUBCUTANEOUS
Status: DISCONTINUED | OUTPATIENT
Start: 2024-12-22 | End: 2024-12-24

## 2024-12-22 RX ORDER — IBUPROFEN 200 MG
16 TABLET ORAL
Status: DISCONTINUED | OUTPATIENT
Start: 2024-12-22 | End: 2024-12-24 | Stop reason: HOSPADM

## 2024-12-22 RX ORDER — POTASSIUM CHLORIDE 7.45 MG/ML
10 INJECTION INTRAVENOUS ONCE
Status: DISCONTINUED | OUTPATIENT
Start: 2024-12-22 | End: 2024-12-22

## 2024-12-22 RX ORDER — IBUPROFEN 200 MG
24 TABLET ORAL
Status: DISCONTINUED | OUTPATIENT
Start: 2024-12-22 | End: 2024-12-24 | Stop reason: HOSPADM

## 2024-12-22 RX ORDER — MAGNESIUM SULFATE HEPTAHYDRATE 40 MG/ML
2 INJECTION, SOLUTION INTRAVENOUS ONCE
Status: COMPLETED | OUTPATIENT
Start: 2024-12-22 | End: 2024-12-22

## 2024-12-22 RX ORDER — SODIUM CHLORIDE 9 MG/ML
INJECTION, SOLUTION INTRAVENOUS CONTINUOUS
Status: DISCONTINUED | OUTPATIENT
Start: 2024-12-23 | End: 2024-12-23

## 2024-12-22 RX ORDER — AMLODIPINE BESYLATE 5 MG/1
5 TABLET ORAL DAILY
Status: DISCONTINUED | OUTPATIENT
Start: 2024-12-22 | End: 2024-12-22

## 2024-12-22 RX ORDER — LOSARTAN POTASSIUM 25 MG/1
25 TABLET ORAL DAILY
Status: DISCONTINUED | OUTPATIENT
Start: 2024-12-22 | End: 2024-12-22

## 2024-12-22 RX ORDER — INSULIN ASPART 100 [IU]/ML
8 INJECTION, SOLUTION INTRAVENOUS; SUBCUTANEOUS
Status: DISCONTINUED | OUTPATIENT
Start: 2024-12-23 | End: 2024-12-23

## 2024-12-22 RX ORDER — DEXTROSE MONOHYDRATE AND SODIUM CHLORIDE 5; .45 G/100ML; G/100ML
INJECTION, SOLUTION INTRAVENOUS CONTINUOUS PRN
Status: DISCONTINUED | OUTPATIENT
Start: 2024-12-22 | End: 2024-12-22

## 2024-12-22 RX ORDER — INSULIN GLARGINE 100 [IU]/ML
25 INJECTION, SOLUTION SUBCUTANEOUS NIGHTLY
Status: DISCONTINUED | OUTPATIENT
Start: 2024-12-22 | End: 2024-12-23

## 2024-12-22 RX ORDER — SODIUM CHLORIDE 0.9 % (FLUSH) 0.9 %
10 SYRINGE (ML) INJECTION
Status: DISCONTINUED | OUTPATIENT
Start: 2024-12-22 | End: 2024-12-24 | Stop reason: HOSPADM

## 2024-12-22 RX ORDER — SODIUM,POTASSIUM PHOSPHATES 280-250MG
2 POWDER IN PACKET (EA) ORAL ONCE
Status: COMPLETED | OUTPATIENT
Start: 2024-12-22 | End: 2024-12-22

## 2024-12-22 RX ORDER — DEXTROSE 4 G
TABLET,CHEWABLE ORAL
Qty: 1 EACH | Refills: 0 | Status: SHIPPED | OUTPATIENT
Start: 2024-12-22

## 2024-12-22 RX ORDER — SODIUM CHLORIDE 0.9 % (FLUSH) 0.9 %
10 SYRINGE (ML) INJECTION
Status: DISCONTINUED | OUTPATIENT
Start: 2024-12-22 | End: 2024-12-22

## 2024-12-22 RX ORDER — BLOOD-GLUCOSE CONTROL, NORMAL
EACH MISCELLANEOUS
Qty: 200 EACH | Refills: 0 | Status: SHIPPED | OUTPATIENT
Start: 2024-12-22

## 2024-12-22 RX ORDER — AMLODIPINE BESYLATE 10 MG/1
10 TABLET ORAL DAILY
Status: DISCONTINUED | OUTPATIENT
Start: 2024-12-22 | End: 2024-12-23

## 2024-12-22 RX ORDER — ONDANSETRON HYDROCHLORIDE 2 MG/ML
4 INJECTION, SOLUTION INTRAVENOUS EVERY 8 HOURS PRN
Status: DISCONTINUED | OUTPATIENT
Start: 2024-12-22 | End: 2024-12-22

## 2024-12-22 RX ORDER — NALOXONE HCL 0.4 MG/ML
0.02 VIAL (ML) INJECTION
Status: DISCONTINUED | OUTPATIENT
Start: 2024-12-22 | End: 2024-12-24 | Stop reason: HOSPADM

## 2024-12-22 RX ORDER — SODIUM CHLORIDE 9 MG/ML
1000 INJECTION, SOLUTION INTRAVENOUS CONTINUOUS
Status: DISCONTINUED | OUTPATIENT
Start: 2024-12-22 | End: 2024-12-22

## 2024-12-22 RX ORDER — DEXTROSE MONOHYDRATE AND SODIUM CHLORIDE 5; .45 G/100ML; G/100ML
125 INJECTION, SOLUTION INTRAVENOUS CONTINUOUS PRN
Status: DISCONTINUED | OUTPATIENT
Start: 2024-12-22 | End: 2024-12-23

## 2024-12-22 RX ORDER — SODIUM CHLORIDE 9 MG/ML
125 INJECTION, SOLUTION INTRAVENOUS CONTINUOUS
Status: DISCONTINUED | OUTPATIENT
Start: 2024-12-22 | End: 2024-12-22

## 2024-12-22 RX ORDER — ACETAMINOPHEN 325 MG/1
650 TABLET ORAL EVERY 4 HOURS PRN
Status: DISCONTINUED | OUTPATIENT
Start: 2024-12-22 | End: 2024-12-24 | Stop reason: HOSPADM

## 2024-12-22 RX ORDER — GLUCAGON 1 MG
1 KIT INJECTION
Status: DISCONTINUED | OUTPATIENT
Start: 2024-12-22 | End: 2024-12-24 | Stop reason: HOSPADM

## 2024-12-22 RX ORDER — POTASSIUM CHLORIDE 7.45 MG/ML
10 INJECTION INTRAVENOUS
Status: DISCONTINUED | OUTPATIENT
Start: 2024-12-22 | End: 2024-12-22

## 2024-12-22 RX ORDER — METHOCARBAMOL 500 MG/1
500 TABLET, FILM COATED ORAL 4 TIMES DAILY PRN
Status: DISCONTINUED | OUTPATIENT
Start: 2024-12-22 | End: 2024-12-24 | Stop reason: HOSPADM

## 2024-12-22 RX ORDER — POTASSIUM CHLORIDE 20 MEQ/1
40 TABLET, EXTENDED RELEASE ORAL ONCE
Status: DISCONTINUED | OUTPATIENT
Start: 2024-12-22 | End: 2024-12-22

## 2024-12-22 RX ORDER — SODIUM CHLORIDE 0.9 % (FLUSH) 0.9 %
10 SYRINGE (ML) INJECTION EVERY 12 HOURS PRN
Status: DISCONTINUED | OUTPATIENT
Start: 2024-12-22 | End: 2024-12-24 | Stop reason: HOSPADM

## 2024-12-22 RX ORDER — ENOXAPARIN SODIUM 100 MG/ML
40 INJECTION SUBCUTANEOUS EVERY 12 HOURS
Status: DISCONTINUED | OUTPATIENT
Start: 2024-12-22 | End: 2024-12-24 | Stop reason: HOSPADM

## 2024-12-22 RX ADMIN — POTASSIUM BICARBONATE 40 MEQ: 391 TABLET, EFFERVESCENT ORAL at 01:12

## 2024-12-22 RX ADMIN — DEXTROSE AND SODIUM CHLORIDE 125 ML/HR: 5; 450 INJECTION, SOLUTION INTRAVENOUS at 04:12

## 2024-12-22 RX ADMIN — MAGNESIUM SULFATE HEPTAHYDRATE 2 G: 40 INJECTION, SOLUTION INTRAVENOUS at 08:12

## 2024-12-22 RX ADMIN — SODIUM CHLORIDE, POTASSIUM CHLORIDE, SODIUM LACTATE AND CALCIUM CHLORIDE 1000 ML: 600; 310; 30; 20 INJECTION, SOLUTION INTRAVENOUS at 11:12

## 2024-12-22 RX ADMIN — INSULIN HUMAN 0.1 UNITS/KG/HR: 1 INJECTION, SOLUTION INTRAVENOUS at 01:12

## 2024-12-22 RX ADMIN — POTASSIUM & SODIUM PHOSPHATES POWDER PACK 280-160-250 MG 2 PACKET: 280-160-250 PACK at 08:12

## 2024-12-22 RX ADMIN — METHOCARBAMOL 500 MG: 500 TABLET ORAL at 03:12

## 2024-12-22 RX ADMIN — POTASSIUM CHLORIDE 10 MEQ: 7.46 INJECTION, SOLUTION INTRAVENOUS at 07:12

## 2024-12-22 RX ADMIN — POTASSIUM BICARBONATE 50 MEQ: 978 TABLET, EFFERVESCENT ORAL at 06:12

## 2024-12-22 RX ADMIN — SODIUM CHLORIDE 125 ML/HR: 9 INJECTION, SOLUTION INTRAVENOUS at 12:12

## 2024-12-22 RX ADMIN — INSULIN GLARGINE 25 UNITS: 100 INJECTION, SOLUTION SUBCUTANEOUS at 08:12

## 2024-12-22 RX ADMIN — MAGNESIUM SULFATE HEPTAHYDRATE 2 G: 40 INJECTION, SOLUTION INTRAVENOUS at 01:12

## 2024-12-22 RX ADMIN — ENOXAPARIN SODIUM 40 MG: 40 INJECTION SUBCUTANEOUS at 08:12

## 2024-12-22 RX ADMIN — POTASSIUM CHLORIDE 10 MEQ: 7.46 INJECTION, SOLUTION INTRAVENOUS at 08:12

## 2024-12-22 RX ADMIN — SODIUM CHLORIDE: 9 INJECTION, SOLUTION INTRAVENOUS at 11:12

## 2024-12-22 RX ADMIN — POTASSIUM CHLORIDE 10 MEQ: 7.46 INJECTION, SOLUTION INTRAVENOUS at 06:12

## 2024-12-22 RX ADMIN — AMLODIPINE BESYLATE 10 MG: 10 TABLET ORAL at 01:12

## 2024-12-22 RX ADMIN — INSULIN HUMAN 0.1 UNITS/KG/HR: 1 INJECTION, SOLUTION INTRAVENOUS at 07:12

## 2024-12-22 NOTE — ED NOTES
Patient identifiers for Lisbeth Moran 29 y.o. female checked and correct.  Chief Complaint   Patient presents with    Blood Sugar Problem     Pt has been taken off her insulin and feels like her blood sugar is off.     Past Medical History:   Diagnosis Date    Anemia     Hypertension      Allergies reported: Review of patient's allergies indicates:  No Known Allergies    Pt endorses insatiable thirst and frequent urination x 5 days, CBG > 500    LOC: Patient is awake, alert, and aware of environment with an appropriate affect. Patient is oriented x 4 and speaking appropriately.  APPEARANCE: Patient resting comfortably and in no acute distress. Patient is clean and well groomed, patient's clothing is properly fastened.  HEENT: WDL  SKIN: The skin is warm and dry. Patient has normal skin turgor and moist mucus membranes.   MUSCULOSKELETAL: Patient is moving all extremities well, no obvious deformities noted. Pulses intact.   RESPIRATORY: Airway is open and patent. Respirations are spontaneous and non-labored with normal effort and rate.  CARDIAC: Patient has a normal rate and rhythm. 70 on cardiac monitor. No peripheral edema noted. Denies chest pain and SOB at at time of assessment.   ABDOMEN: No distention noted. Soft and non-tender upon palpation.  NEUROLOGICAL: pupils 3mm, PERRL. Facial expression is symmetrical. Hand grasps are equal bilaterally. Normal sensation in all extremities when touched with finger.

## 2024-12-22 NOTE — SUBJECTIVE & OBJECTIVE
Past Medical History:   Diagnosis Date    Anemia     Hypertension        History reviewed. No pertinent surgical history.    Review of patient's allergies indicates:  No Known Allergies    No current facility-administered medications on file prior to encounter.     Current Outpatient Medications on File Prior to Encounter   Medication Sig    metFORMIN (GLUCOPHAGE) 500 MG tablet Take 500 mg by mouth 2 (two) times daily with meals.    ondansetron (ZOFRAN-ODT) 4 MG TbDL DISSOLVE 1 tablet (4 mg total) by mouth every 8 (eight) hours as needed (nausea).    [DISCONTINUED] hydrocodone-acetaminophen 7.5-325mg (NORCO) 7.5-325 mg per tablet Take 1 tablet by mouth every 6 (six) hours as needed for Pain.    [DISCONTINUED] losartan (COZAAR) 50 MG tablet Take 50 mg by mouth once daily.    [DISCONTINUED] medroxyPROGESTERone (PROVERA) 10 MG tablet Take 10 mg by mouth once daily.    [DISCONTINUED] naproxen (NAPROSYN) 500 MG tablet Take 1 tablet (500 mg total) by mouth 2 (two) times daily with meals.    [DISCONTINUED] traMADol (ULTRAM) 50 mg tablet Take 50 mg by mouth every 6 (six) hours as needed for Pain.     Family History       Problem Relation (Age of Onset)    Cancer Maternal Grandmother    Diabetes Maternal Grandmother    Hypertension Sister          Tobacco Use    Smoking status: Every Day     Types: Cigarettes    Smokeless tobacco: Never    Tobacco comments:     Few cigarettes a day   Substance and Sexual Activity    Alcohol use: Yes     Comment: occassinal    Drug use: No    Sexual activity: Not Currently     Partners: Male     Review of Systems   Constitutional:  Negative for activity change, chills and fever.   Respiratory:  Negative for chest tightness and shortness of breath.    Cardiovascular:  Negative for chest pain and leg swelling.   Gastrointestinal:  Negative for abdominal pain, diarrhea, nausea and vomiting.   Endocrine: Positive for polydipsia and polyuria.   Genitourinary:  Positive for urgency. Negative for  dysuria and hematuria.   Skin:  Negative for wound.   Neurological:  Negative for dizziness and headaches.     Objective:     Vital Signs (Most Recent):  Temp: 98.6 °F (37 °C) (12/22/24 0743)  Pulse: 70 (12/22/24 0910)  Resp: 16 (12/22/24 0910)  BP: (!) 194/90 (12/22/24 0910)  SpO2: 100 % (12/22/24 0910) Vital Signs (24h Range):  Temp:  [98.6 °F (37 °C)] 98.6 °F (37 °C)  Pulse:  [70-86] 70  Resp:  [16-20] 16  SpO2:  [98 %-100 %] 100 %  BP: (180-194)/() 194/90     Weight: (!) 147.9 kg (326 lb)  Body mass index is 45.47 kg/m².     Physical Exam  Constitutional:       General: She is not in acute distress.     Appearance: She is obese. She is not ill-appearing.   Eyes:      General: No scleral icterus.     Conjunctiva/sclera: Conjunctivae normal.   Cardiovascular:      Rate and Rhythm: Normal rate and regular rhythm.      Heart sounds: Normal heart sounds.   Pulmonary:      Effort: Pulmonary effort is normal. No respiratory distress.      Breath sounds: Normal breath sounds.   Abdominal:      General: Abdomen is flat.      Palpations: Abdomen is soft.      Tenderness: There is no abdominal tenderness.   Musculoskeletal:      Right lower leg: No edema.      Left lower leg: No edema.   Skin:     General: Skin is warm.   Neurological:      General: No focal deficit present.      Mental Status: She is alert and oriented to person, place, and time.   Psychiatric:         Mood and Affect: Mood normal.         Thought Content: Thought content normal.                Significant Labs: All pertinent labs within the past 24 hours have been reviewed.  Recent Lab Results  (Last 5 results in the past 24 hours)        12/22/24  1113   12/22/24  1113   12/22/24  0924   12/22/24  0916   12/22/24  0910        Albumin       3.8         ALP       203         Allens Test     N/A           ALT       34         Anion Gap       17         AST       19         Baso #       0.01         Basophil %       0.1         Beta-Hydroxybutyrate        2.7         BILIRUBIN TOTAL       0.3  Comment: For infants and newborns, interpretation of results should be based  on gestational age, weight and in agreement with clinical  observations.    Premature Infant recommended reference ranges:  Up to 24 hours.............<8.0 mg/dL  Up to 48 hours............<12.0 mg/dL  3-5 days..................<15.0 mg/dL  6-29 days.................<15.0 mg/dL           Site     Other           BUN       11         Calcium       9.3         Chloride       99         CO2       17         Creatinine       1.1         Differential Method       Automated         eGFR       >60.0         Eos #       0.3         Eos %       3.2         Estimated Avg Glucose       298         Glucose       432         Gran # (ANC)       2.7         Gran %       34.6         Hematocrit       39.6         Hemoglobin       12.3         Hemoglobin A1C External       12.0  Comment: ADA Screening Guidelines:  5.7-6.4%  Consistent with prediabetes  >or=6.5%  Consistent with diabetes    High levels of fetal hemoglobin interfere with the HbA1C  assay. Heterozygous hemoglobin variants (HbS, HgC, etc)do  not significantly interfere with this assay.   However, presence of multiple variants may affect accuracy.           Immature Grans (Abs)       0.01  Comment: Mild elevation in immature granulocytes is non specific and   can be seen in a variety of conditions including stress response,   acute inflammation, trauma and pregnancy. Correlation with other   laboratory and clinical findings is essential.           Immature Granulocytes       0.1         Lymph #       4.3         Lymph %       54.4         Magnesium    1.7             MCH       24.6         MCHC       31.1         MCV       79         Mono #       0.6         Mono %       7.6         MPV       9.8         nRBC       0         Phosphorus Level   2.9             Platelet Count       447         POC BE     -5           POC HCO3     21.8           POC PCO2      45.1           POC PH     7.292           POC PO2     45           POC SATURATED O2     75           POC TCO2     23           POCT Glucose >500               Potassium       3.6         hCG Qualitative, Urine         Negative       PROTEIN TOTAL       8.1          Acceptable         Yes       RBC       5.01         RDW       16.6         Sample     VENOUS           Sodium       133         WBC       7.87                                Significant Imaging: I have reviewed all pertinent imaging results/findings within the past 24 hours.

## 2024-12-22 NOTE — ASSESSMENT & PLAN NOTE
29yF presented following 1 month of polyuria and polydipsia to the point of interfering with work. Reports compliance with home metformin 500 BID. Previously on insulin but stopped in 2021. A1c 5.6 1 year ago, 12.0 on admit. Does not check glucose at home. Symptoms began following GI infection. No current respiratory or GI infections. No wounds. pH 7.29 on admit vbg. AG 17.     - on DKA pathway  - insulin drip, transition to injection when appropriate  - IVF  - q4h BMP, replete K as needed  - NPO  - strict ins/outs  - providing diabetic education

## 2024-12-22 NOTE — ED PROVIDER NOTES
Encounter Date: 12/22/2024       History     Chief Complaint   Patient presents with    Blood Sugar Problem     Pt has been taken off her insulin and feels like her blood sugar is off.     Patient was a 29-year-old female with past medical history of hypertension and diabetes that presents to emergency department with polyuria.  Patient states that she has been urinating 2-3 times an hour for the past 3 days.  Also admits severe thirst.  Has drank 24 pack of water bottles in the last 24. States that otherwise has no current symptoms.  Works as a  and has become concerned that her urinary frequency is affecting her employment.  Not take insulin.  Only takes metformin for diabetes.    The history is provided by the patient and medical records.     Review of patient's allergies indicates:  No Known Allergies  Past Medical History:   Diagnosis Date    Anemia     Hypertension      History reviewed. No pertinent surgical history.  Family History   Problem Relation Name Age of Onset    Hypertension Sister      Cancer Maternal Grandmother      Diabetes Maternal Grandmother       Social History     Tobacco Use    Smoking status: Every Day     Types: Cigarettes    Smokeless tobacco: Never    Tobacco comments:     Few cigarettes a day   Substance Use Topics    Alcohol use: Yes     Comment: occassinal    Drug use: No     Review of Systems  ROS negative except as noted in HPI   Physical Exam     Initial Vitals [12/22/24 0743]   BP Pulse Resp Temp SpO2   (!) 180/111 86 20 98.6 °F (37 °C) 98 %      MAP       --         Physical Exam    Nursing note and vitals reviewed.  Constitutional: No distress.   HENT:   Head: Normocephalic.   Right Ear: External ear normal.   Left Ear: External ear normal.   Nose: Nose normal. Mouth/Throat: Mucous membranes are dry.   Eyes: Conjunctivae are normal.   Cardiovascular:  Normal rate, regular rhythm, normal heart sounds and intact distal pulses.           Pulmonary/Chest: Breath  sounds normal.   Abdominal: Abdomen is soft. She exhibits no distension.     Neurological: She is alert and oriented to person, place, and time. She has normal strength.   Skin: Skin is warm. Capillary refill takes less than 2 seconds.   Psychiatric: She has a normal mood and affect.         ED Course   Procedures  Labs Reviewed   CBC W/ AUTO DIFFERENTIAL - Abnormal       Result Value    WBC 7.87      RBC 5.01      Hemoglobin 12.3      Hematocrit 39.6      MCV 79 (*)     MCH 24.6 (*)     MCHC 31.1 (*)     RDW 16.6 (*)     Platelets 447      MPV 9.8      Immature Granulocytes 0.1      Gran # (ANC) 2.7      Immature Grans (Abs) 0.01      Lymph # 4.3      Mono # 0.6      Eos # 0.3      Baso # 0.01      nRBC 0      Gran % 34.6 (*)     Lymph % 54.4 (*)     Mono % 7.6      Eosinophil % 3.2      Basophil % 0.1      Differential Method Automated     COMPREHENSIVE METABOLIC PANEL - Abnormal    Sodium 133 (*)     Potassium 3.6      Chloride 99      CO2 17 (*)     Glucose 432 (*)     BUN 11      Creatinine 1.1      Calcium 9.3      Total Protein 8.1      Albumin 3.8      Total Bilirubin 0.3      Alkaline Phosphatase 203 (*)     AST 19      ALT 34      eGFR >60.0      Anion Gap 17 (*)    BETA - HYDROXYBUTYRATE, SERUM - Abnormal    Beta-Hydroxybutyrate 2.7 (*)    URINALYSIS, REFLEX TO URINE CULTURE - Abnormal    Specimen UA Urine, Clean Catch      Color, UA Colorless (*)     Appearance, UA Clear      pH, UA 5.0      Specific Gravity, UA >1.030 (*)     Protein, UA Negative      Glucose, UA 4+ (*)     Ketones, UA 3+ (*)     Bilirubin (UA) Negative      Occult Blood UA Negative      Nitrite, UA Negative      Leukocytes, UA Negative      Narrative:     Specimen Source->Urine   HEMOGLOBIN A1C - Abnormal    Hemoglobin A1C 12.0 (*)     Estimated Avg Glucose 298 (*)    BASIC METABOLIC PANEL - Abnormal    Sodium 131 (*)     Potassium 3.7      Chloride 98      CO2 18 (*)     Glucose 434 (*)     BUN 9      Creatinine 1.0      Calcium 9.1       Anion Gap 15      eGFR >60.0      Narrative:     Now and then every 2 hours while glucose remains above or  below critical value; then reduce to every 4 hours.   BASIC METABOLIC PANEL - Abnormal    Sodium 137      Potassium 3.1 (*)     Chloride 105      CO2 20 (*)     Glucose 129 (*)     BUN 8      Creatinine 0.9      Calcium 8.9      Anion Gap 12      eGFR >60.0      Narrative:     Now and then every 2 hours while glucose remains above or  below critical value; then reduce to every 4 hours.   POCT GLUCOSE - Abnormal    POCT Glucose >500 (*)    ISTAT PROCEDURE - Abnormal    POC PH 7.292 (*)     POC PCO2 45.1 (*)     POC PO2 45      POC HCO3 21.8 (*)     POC BE -5 (*)     POC SATURATED O2 75      POC TCO2 23 (*)     Sample VENOUS      Site Other      Allens Test N/A     POCT GLUCOSE - Abnormal    POCT Glucose >500 (*)    POCT GLUCOSE - Abnormal    POCT Glucose 241 (*)    POCT GLUCOSE - Abnormal    POCT Glucose 130 (*)    POCT GLUCOSE - Abnormal    POCT Glucose 146 (*)    URINALYSIS MICROSCOPIC    RBC, UA 1      WBC, UA 0      Bacteria Rare      Yeast, UA None      Squam Epithel, UA 1      Microscopic Comment SEE COMMENT      Narrative:     Specimen Source->Urine   PHOSPHORUS    Phosphorus 2.9      Narrative:     With next lab draw   MAGNESIUM    Magnesium 1.7      Narrative:     With next lab draw   LACTIC ACID, PLASMA    Lactate (Lactic Acid) 1.0     POCT URINE PREGNANCY    POC Preg Test, Ur Negative       Acceptable Yes          ECG Results              EKG 12-lead (Final result)        Collection Time Result Time QRS Duration OHS QTC Calculation    12/22/24 09:09:29 12/22/24 09:35:11 108 454                     Final result by Interface, Lab In [facility] (12/22/24 09:35:15)                   Narrative:    Test Reason : R73.9,    Vent. Rate :  60 BPM     Atrial Rate :  60 BPM     P-R Int : 162 ms          QRS Dur : 108 ms      QT Int : 454 ms       P-R-T Axes :  33  10  23 degrees    QTcB Int : 454  ms    Program found technically poor ECG  Normal sinus rhythm with sinus arrhythmia  Normal ECG  When compared with ECG of 26-Nov-2024 20:14,  Nonspecific T wave abnormality no longer evident in Lateral leads  Confirmed by Rex Frias ([id number]) on 12/22/2024 9:35:06 AM    Referred By: ROSLYNERRAL SELF           Confirmed By: Rex Frias                                  Imaging Results    None          Medications   sodium chloride 0.9% flush 10 mL (has no administration in time range)   naloxone 0.4 mg/mL injection 0.02 mg (has no administration in time range)   glucose chewable tablet 16 g (has no administration in time range)   glucose chewable tablet 24 g (has no administration in time range)   glucagon (human recombinant) injection 1 mg (has no administration in time range)   enoxaparin injection 40 mg (40 mg Subcutaneous Not Given 12/23/24 0900)   sodium chloride 0.9% flush 10 mL (has no administration in time range)   dextrose 50% injection 25 g (has no administration in time range)   dextrose 50% injection 12.5 g (has no administration in time range)   ondansetron injection 4 mg (has no administration in time range)   acetaminophen tablet 650 mg (has no administration in time range)   methocarbamoL tablet 500 mg (500 mg Oral Given 12/22/24 1529)   insulin glargine U-100 (Lantus) pen 25 Units (25 Units Subcutaneous Given 12/22/24 2044)   insulin aspart U-100 pen 8 Units (8 Units Subcutaneous Given 12/23/24 1316)   insulin aspart U-100 pen 0-5 Units (3 Units Subcutaneous Given 12/23/24 1316)   losartan tablet 50 mg (has no administration in time range)   lactated ringers bolus 1,000 mL (0 mLs Intravenous Stopped 12/22/24 1229)   magnesium sulfate 2g in water 50mL IVPB (premix) (0 g Intravenous Stopped 12/22/24 1406)   insulin regular bolus from bag/infusion 14.79 Units 14.79 mL (14.79 Units Intravenous Bolus from Bag 12/22/24 1320)   potassium bicarbonate disintegrating tablet 40 mEq (40 mEq Oral Given 12/22/24  1305)   potassium bicarbonate disintegrating tablet 50 mEq (50 mEq Oral Given 12/22/24 1838)   magnesium sulfate 2g in water 50mL IVPB (premix) (0 g Intravenous Stopped 12/22/24 2252)   potassium, sodium phosphates 280-160-250 mg packet 2 packet (2 packets Oral Given 12/22/24 2052)   potassium chloride SA CR tablet 40 mEq (40 mEq Oral Given 12/23/24 0833)   potassium, sodium phosphates 280-160-250 mg packet 2 packet (2 packets Oral Given 12/23/24 1104)   potassium chloride SA CR tablet 20 mEq (20 mEq Oral Given 12/23/24 1316)     Medical Decision Making  Patient was a 29-year-old female with past medical history of hypertension and diabetes that presents to emergency department with polyuria.     On initial evaluation patient was in no acute distress, cooperative with the history and physical examination and speaking in full sentences.    Differential for patient's presentation includes DKA, HHS, hyperglycemia without DKA, UTI.  Patient previously on insulin however not currently on in's and only taking metformin for diabetes.  Suspect that hyperglycemia cause of all her symptoms.    See ED course below.  Patient admitted to hospital medicine for DKA.    Amount and/or Complexity of Data Reviewed  Labs: ordered. Decision-making details documented in ED Course.    Risk  Prescription drug management.  Decision regarding hospitalization.              Attending Attestation:   Physician Attestation Statement for Resident:  As the supervising MD   Physician Attestation Statement: I have personally seen and examined this patient.   I agree with the above history.  -:   As the supervising MD I agree with the above PE.     As the supervising MD I agree with the above treatment, course, plan, and disposition.                    ED Course as of 12/23/24 1344   Sun Dec 22, 2024   0912 hCG Qualitative, Urine: Negative [TK]   0912 POCT Glucose(!!): >500 [TK]   0941 Beta-Hydroxybutyrate(!): 2.7 [TK]   1019 Glucose(!): 432 [TK]    1019 Sodium(!): 133 [TK]   1019 CO2(!): 17  Elevation in gap as well as elevated beta hydroxybutyrate -  [TK]   1019 Anion Gap(!): 17 [TK]   1157 Hemoglobin A1C External(!): 12.0  Chronically elevated HgbA1c [TK]   1320 Urinalysis, Reflex to Urine Culture Urine, Clean Catch(!)  A UTI, there is ketones and glucose present consistent with patient's presentation of ketoacidosis [TK]      ED Course User Index  [TK] Amos Ochoa DO                             Clinical Impression:  Final diagnoses:  [R73.9] Hyperglycemia  [E11.10] Diabetic ketoacidosis without coma associated with type 2 diabetes mellitus (Primary)          ED Disposition Condition    Admit                 Amos Ochoa DO  Resident  12/22/24 [id number]       Yennifer Box MD  12/23/24 [id number]

## 2024-12-22 NOTE — PROGRESS NOTES
Pharmacist Renal Dose Adjustment Note    Lisbeth Moran is a 29 y.o. female being treated with the medication Lovenox    Patient Data:    Vital Signs (Most Recent):  Temp: 98.6 °F (37 °C) (12/22/24 0743)  Pulse: 70 (12/22/24 0910)  Resp: 16 (12/22/24 0910)  BP: (!) 194/90 (12/22/24 0910)  SpO2: 100 % (12/22/24 0910) Vital Signs (72h Range):  Temp:  [98.6 °F (37 °C)]   Pulse:  [70-86]   Resp:  [16-20]   BP: (180-194)/()   SpO2:  [98 %-100 %]      Recent Labs   Lab 12/22/24 0916   CREATININE 1.1     Serum creatinine: 1.1 mg/dL 12/22/24 0916  Estimated creatinine clearance: 118.9 mL/min    Medication:Lovenox 40 mg SQ every 24 hrs will be changed to Lovenox 40 mg SQ every 12 hrs (BMI = 45.5 kg/m2)    Pharmacist's Name: Richi Jose  Pharmacist's Extension: [phone]

## 2024-12-22 NOTE — H&P
[facility] Emergency Dept  Hospital Medicine  History & Physical    Patient Name: Lisbeth Moran  MRN: [id number]  Patient Class: IP- Inpatient  Admission Date: 12/22/2024  Attending Physician: Charles Burgess MD   Primary Care Provider: Stacey Scherer MD         Patient information was obtained from patient and ER records.     Subjective:     Principal Problem:Diabetic ketoacidosis without coma associated with type 2 diabetes mellitus    Chief Complaint:   Chief Complaint   Patient presents with    Blood Sugar Problem     Pt has been taken off her insulin and feels like her blood sugar is off.        HPI: Lisbeth Moran is a 29-year-old woman with HTN, PCOS and DMT2 who presented to the ED for polyuria and polydipsia for the past month which acutely worsened over the last 3 days. 1 month ago she had a brief GI infection which was self-limited. After she recovered she began noticing increased thirst and urination. Recently it has become difficult to work due to frequency of urination. She denies any nausea, vomiting, diarrhea, SOB, fevers, chills, or headaches. No recents illness or wounds. Appetite normal. She reports compliance with home metformin 500 BID and amlodipine 10mg daily but does not check blood sugar or blood pressure at home. She missed her morning doses when presenting to the ED. She notes a prior hospitalization for DKA (2021) and previous insulin use, but was transitioned to metformin in 2021 with no issues since.     In the ED, vitals found T 98.6F, /111, and HR 86. Lab work found glucose >500, 3+ ketones in the urine, elevated beta-hydroxyurea, VBG with pH 7.29 and AG of 17; consistent with mild DKA. Creatinine 1.1 from baseline 0.9. A1c 1 year ago 5.7, now 12.0. She received 1L LR. She will be admitted to hospital medicine for management of DKA.    Past Medical History:   Diagnosis Date    Anemia     Hypertension        History reviewed. No pertinent surgical history.    Review of  patient's allergies indicates:  No Known Allergies    No current facility-administered medications on file prior to encounter.     Current Outpatient Medications on File Prior to Encounter   Medication Sig    metFORMIN (GLUCOPHAGE) 500 MG tablet Take 500 mg by mouth 2 (two) times daily with meals.    ondansetron (ZOFRAN-ODT) 4 MG TbDL DISSOLVE 1 tablet (4 mg total) by mouth every 8 (eight) hours as needed (nausea).    [DISCONTINUED] hydrocodone-acetaminophen 7.5-325mg (NORCO) 7.5-325 mg per tablet Take 1 tablet by mouth every 6 (six) hours as needed for Pain.    [DISCONTINUED] losartan (COZAAR) 50 MG tablet Take 50 mg by mouth once daily.    [DISCONTINUED] medroxyPROGESTERone (PROVERA) 10 MG tablet Take 10 mg by mouth once daily.    [DISCONTINUED] naproxen (NAPROSYN) 500 MG tablet Take 1 tablet (500 mg total) by mouth 2 (two) times daily with meals.    [DISCONTINUED] traMADol (ULTRAM) 50 mg tablet Take 50 mg by mouth every 6 (six) hours as needed for Pain.     Family History       Problem Relation (Age of Onset)    Cancer Maternal Grandmother    Diabetes Maternal Grandmother    Hypertension Sister          Tobacco Use    Smoking status: Every Day     Types: Cigarettes    Smokeless tobacco: Never    Tobacco comments:     Few cigarettes a day   Substance and Sexual Activity    Alcohol use: Yes     Comment: occassinal    Drug use: No    Sexual activity: Not Currently     Partners: Male     Review of Systems   Constitutional:  Negative for activity change, chills and fever.   Respiratory:  Negative for chest tightness and shortness of breath.    Cardiovascular:  Negative for chest pain and leg swelling.   Gastrointestinal:  Negative for abdominal pain, diarrhea, nausea and vomiting.   Endocrine: Positive for polydipsia and polyuria.   Genitourinary:  Positive for urgency. Negative for dysuria and hematuria.   Skin:  Negative for wound.   Neurological:  Negative for dizziness and headaches.     Objective:     Vital Signs  (Most Recent):  Temp: 98.6 °F (37 °C) (12/22/24 0743)  Pulse: 70 (12/22/24 0910)  Resp: 16 (12/22/24 0910)  BP: (!) 194/90 (12/22/24 0910)  SpO2: 100 % (12/22/24 0910) Vital Signs (24h Range):  Temp:  [98.6 °F (37 °C)] 98.6 °F (37 °C)  Pulse:  [70-86] 70  Resp:  [16-20] 16  SpO2:  [98 %-100 %] 100 %  BP: (180-194)/() 194/90     Weight: (!) 147.9 kg (326 lb)  Body mass index is 45.47 kg/m².     Physical Exam  Constitutional:       General: She is not in acute distress.     Appearance: She is obese. She is not ill-appearing.   Eyes:      General: No scleral icterus.     Conjunctiva/sclera: Conjunctivae normal.   Cardiovascular:      Rate and Rhythm: Normal rate and regular rhythm.      Heart sounds: Normal heart sounds.   Pulmonary:      Effort: Pulmonary effort is normal. No respiratory distress.      Breath sounds: Normal breath sounds.   Abdominal:      General: Abdomen is flat.      Palpations: Abdomen is soft.      Tenderness: There is no abdominal tenderness.   Musculoskeletal:      Right lower leg: No edema.      Left lower leg: No edema.   Skin:     General: Skin is warm.   Neurological:      General: No focal deficit present.      Mental Status: She is alert and oriented to person, place, and time.   Psychiatric:         Mood and Affect: Mood normal.         Thought Content: Thought content normal.                Significant Labs: All pertinent labs within the past 24 hours have been reviewed.  Recent Lab Results  (Last 5 results in the past 24 hours)        12/22/24  1113   12/22/24  1113   12/22/24  0924   12/22/24  0916   12/22/24  0910        Albumin       3.8         ALP       203         Allens Test     N/A           ALT       34         Anion Gap       17         AST       19         Baso #       0.01         Basophil %       0.1         Beta-Hydroxybutyrate       2.7         BILIRUBIN TOTAL       0.3  Comment: For infants and newborns, interpretation of results should be based  on gestational  age, weight and in agreement with clinical  observations.    Premature Infant recommended reference ranges:  Up to 24 hours.............<8.0 mg/dL  Up to 48 hours............<12.0 mg/dL  3-5 days..................<15.0 mg/dL  6-29 days.................<15.0 mg/dL           Site     Other           BUN       11         Calcium       9.3         Chloride       99         CO2       17         Creatinine       1.1         Differential Method       Automated         eGFR       >60.0         Eos #       0.3         Eos %       3.2         Estimated Avg Glucose       298         Glucose       432         Gran # (ANC)       2.7         Gran %       34.6         Hematocrit       39.6         Hemoglobin       12.3         Hemoglobin A1C External       12.0  Comment: ADA Screening Guidelines:  5.7-6.4%  Consistent with prediabetes  >or=6.5%  Consistent with diabetes    High levels of fetal hemoglobin interfere with the HbA1C  assay. Heterozygous hemoglobin variants (HbS, HgC, etc)do  not significantly interfere with this assay.   However, presence of multiple variants may affect accuracy.           Immature Grans (Abs)       0.01  Comment: Mild elevation in immature granulocytes is non specific and   can be seen in a variety of conditions including stress response,   acute inflammation, trauma and pregnancy. Correlation with other   laboratory and clinical findings is essential.           Immature Granulocytes       0.1         Lymph #       4.3         Lymph %       54.4         Magnesium    1.7             MCH       24.6         MCHC       31.1         MCV       79         Mono #       0.6         Mono %       7.6         MPV       9.8         nRBC       0         Phosphorus Level   2.9             Platelet Count       447         POC BE     -5           POC HCO3     21.8           POC PCO2     45.1           POC PH     7.292           POC PO2     45           POC SATURATED O2     75           POC TCO2     23           POCT  Glucose >500               Potassium       3.6         hCG Qualitative, Urine         Negative       PROTEIN TOTAL       8.1          Acceptable         Yes       RBC       5.01         RDW       16.6         Sample     VENOUS           Sodium       133         WBC       7.87                                Significant Imaging: I have reviewed all pertinent imaging results/findings within the past 24 hours.  Assessment/Plan:     * Diabetic ketoacidosis without coma associated with type 2 diabetes mellitus  29yF presented following 1 month of polyuria and polydipsia to the point of interfering with work. Reports compliance with home metformin 500 BID. Previously on insulin but stopped in 2021. A1c 5.6 1 year ago, 12.0 on admit. Does not check glucose at home. Symptoms began following GI infection. No current respiratory or GI infections. No wounds. pH 7.29 on admit vbg. AG 17.     - on DKA pathway  - insulin drip, transition to injection when appropriate  - IVF  - q4h BMP, replete K as needed  - NPO  - strict ins/outs  - providing diabetic education      Primary hypertension  Patient's blood pressure range in the last 24 hours was: BP  Min: 180/111  Max: 194/90.The patient's inpatient anti-hypertensive regimen is listed below:  Current Antihypertensives  amLODIPine tablet 10 mg, Daily, Oral    BP elevated on admit. Missed dose on day of admission, but generally adherent to amlodipine 10mg daily. Does not take BP at home.    Plan  - continue amlodipine 10mg daily  - adjust regimen as needed for appropriate BP control  - f/u lipid profile to evaluate for metabolic syndrome    Morbid obesity  Body mass index is 45.47 kg/m². Morbid obesity complicates all aspects of disease management from diagnostic modalities to treatment. Weight loss encouraged and health benefits explained to patient.         Type 2 diabetes mellitus with hyperglycemia, without long-term current use of insulin  See DKA        VTE Risk  Mitigation (From admission, onward)           Ordered     enoxaparin injection 40 mg  Every 12 hours         12/22/24 1214     IP VTE HIGH RISK PATIENT  Once         12/22/24 1214     Place sequential compression device  Until discontinued         12/22/24 1214                    Tata Maier MD  Department of Hospital Medicine  [facility] - Emergency Dept

## 2024-12-22 NOTE — ASSESSMENT & PLAN NOTE
Patient's blood pressure range in the last 24 hours was: BP  Min: 180/111  Max: 194/90.The patient's inpatient anti-hypertensive regimen is listed below:  Current Antihypertensives  amLODIPine tablet 10 mg, Daily, Oral    BP elevated on admit. Missed dose on day of admission, but generally adherent to amlodipine 10mg daily. Does not take BP at home.    Plan  - continue amlodipine 10mg daily  - adjust regimen as needed for appropriate BP control  - f/u lipid profile to evaluate for metabolic syndrome

## 2024-12-22 NOTE — HPI
Lisbeth Moarn is a 29-year-old woman with HTN, PCOS and DMT2 who presented to the ED for polyuria and polydipsia for the past month which acutely worsened over the last 3 days. 1 month ago she had a brief GI infection which was self-limited. After she recovered she began noticing increased thirst and urination. Recently it has become difficult to work due to frequency of urination. She denies any nausea, vomiting, diarrhea, SOB, fevers, chills, or headaches. No recents illness or wounds. Appetite normal. She reports compliance with home metformin 500 BID and amlodipine 10mg daily but does not check blood sugar or blood pressure at home. She missed her morning doses when presenting to the ED. She notes a prior hospitalization for DKA (2021) and previous insulin use, but was transitioned to metformin in 2021 with no issues since.     In the ED, vitals found T 98.6F, /111, and HR 86. Lab work found glucose >500, 3+ ketones in the urine, elevated beta-hydroxyurea, VBG with pH 7.29 and AG of 17; consistent with mild DKA. Creatinine 1.1 from baseline 0.9. A1c 1 year ago 5.7, now 12.0. She received 1L LR. She will be admitted to hospital medicine for management of DKA.

## 2024-12-22 NOTE — ASSESSMENT & PLAN NOTE
Body mass index is 45.47 kg/m². Morbid obesity complicates all aspects of disease management from diagnostic modalities to treatment. Weight loss encouraged and health benefits explained to patient.

## 2024-12-23 ENCOUNTER — PATIENT MESSAGE (OUTPATIENT)
Dept: ADMINISTRATIVE | Facility: OTHER | Age: 29
End: 2024-12-23

## 2024-12-23 LAB
ALBUMIN SERPL BCP-MCNC: 3.1 G/DL (ref 3.5–5.2)
ALBUMIN SERPL BCP-MCNC: 3.3 G/DL (ref 3.5–5.2)
ALP SERPL-CCNC: 133 U/L (ref 40–150)
ALP SERPL-CCNC: 147 U/L (ref 40–150)
ALT SERPL W/O P-5'-P-CCNC: 36 U/L (ref 10–44)
ALT SERPL W/O P-5'-P-CCNC: 36 U/L (ref 10–44)
ANION GAP SERPL CALC-SCNC: 11 MMOL/L (ref 8–16)
ANION GAP SERPL CALC-SCNC: 11 MMOL/L (ref 8–16)
ANION GAP SERPL CALC-SCNC: 12 MMOL/L (ref 8–16)
ANION GAP SERPL CALC-SCNC: 12 MMOL/L (ref 8–16)
AST SERPL-CCNC: 32 U/L (ref 10–40)
AST SERPL-CCNC: 33 U/L (ref 10–40)
BASOPHILS # BLD AUTO: 0.03 K/UL (ref 0–0.2)
BASOPHILS NFR BLD: 0.5 % (ref 0–1.9)
BILIRUB SERPL-MCNC: 0.2 MG/DL (ref 0.1–1)
BILIRUB SERPL-MCNC: 0.3 MG/DL (ref 0.1–1)
BUN SERPL-MCNC: 6 MG/DL (ref 6–20)
BUN SERPL-MCNC: 6 MG/DL (ref 6–20)
BUN SERPL-MCNC: 7 MG/DL (ref 6–20)
BUN SERPL-MCNC: 8 MG/DL (ref 6–20)
CALCIUM SERPL-MCNC: 8.3 MG/DL (ref 8.7–10.5)
CALCIUM SERPL-MCNC: 8.3 MG/DL (ref 8.7–10.5)
CALCIUM SERPL-MCNC: 8.4 MG/DL (ref 8.7–10.5)
CALCIUM SERPL-MCNC: 8.7 MG/DL (ref 8.7–10.5)
CHLORIDE SERPL-SCNC: 103 MMOL/L (ref 95–110)
CHLORIDE SERPL-SCNC: 103 MMOL/L (ref 95–110)
CHLORIDE SERPL-SCNC: 104 MMOL/L (ref 95–110)
CHLORIDE SERPL-SCNC: 104 MMOL/L (ref 95–110)
CHOLEST SERPL-MCNC: 162 MG/DL (ref 120–199)
CHOLEST/HDLC SERPL: 6.2 {RATIO} (ref 2–5)
CO2 SERPL-SCNC: 18 MMOL/L (ref 23–29)
CO2 SERPL-SCNC: 18 MMOL/L (ref 23–29)
CO2 SERPL-SCNC: 19 MMOL/L (ref 23–29)
CO2 SERPL-SCNC: 21 MMOL/L (ref 23–29)
CREAT SERPL-MCNC: 0.8 MG/DL (ref 0.5–1.4)
CREAT SERPL-MCNC: 0.9 MG/DL (ref 0.5–1.4)
DIFFERENTIAL METHOD BLD: ABNORMAL
EOSINOPHIL # BLD AUTO: 0.2 K/UL (ref 0–0.5)
EOSINOPHIL NFR BLD: 3.3 % (ref 0–8)
ERYTHROCYTE [DISTWIDTH] IN BLOOD BY AUTOMATED COUNT: 17 % (ref 11.5–14.5)
EST. GFR  (NO RACE VARIABLE): >60 ML/MIN/1.73 M^2
FERRITIN SERPL-MCNC: 30 NG/ML (ref 20–300)
GLUCOSE SERPL-MCNC: 148 MG/DL (ref 70–110)
GLUCOSE SERPL-MCNC: 256 MG/DL (ref 70–110)
GLUCOSE SERPL-MCNC: 337 MG/DL (ref 70–110)
GLUCOSE SERPL-MCNC: 359 MG/DL (ref 70–110)
HCT VFR BLD AUTO: 38.3 % (ref 37–48.5)
HDLC SERPL-MCNC: 26 MG/DL (ref 40–75)
HDLC SERPL: 16 % (ref 20–50)
HGB BLD-MCNC: 12.2 G/DL (ref 12–16)
IMM GRANULOCYTES # BLD AUTO: 0.01 K/UL (ref 0–0.04)
IMM GRANULOCYTES NFR BLD AUTO: 0.2 % (ref 0–0.5)
IRON SERPL-MCNC: 41 UG/DL (ref 30–160)
LDLC SERPL CALC-MCNC: 75.4 MG/DL (ref 63–159)
LYMPHOCYTES # BLD AUTO: 3.5 K/UL (ref 1–4.8)
LYMPHOCYTES NFR BLD: 58.2 % (ref 18–48)
MAGNESIUM SERPL-MCNC: 1.9 MG/DL (ref 1.6–2.6)
MCH RBC QN AUTO: 25.4 PG (ref 27–31)
MCHC RBC AUTO-ENTMCNC: 31.9 G/DL (ref 32–36)
MCV RBC AUTO: 80 FL (ref 82–98)
MONOCYTES # BLD AUTO: 0.4 K/UL (ref 0.3–1)
MONOCYTES NFR BLD: 7.3 % (ref 4–15)
NEUTROPHILS # BLD AUTO: 1.8 K/UL (ref 1.8–7.7)
NEUTROPHILS NFR BLD: 30.5 % (ref 38–73)
NONHDLC SERPL-MCNC: 136 MG/DL
NRBC BLD-RTO: 0 /100 WBC
PHOSPHATE SERPL-MCNC: 2.2 MG/DL (ref 2.7–4.5)
PLATELET # BLD AUTO: 412 K/UL (ref 150–450)
PMV BLD AUTO: 9.7 FL (ref 9.2–12.9)
POCT GLUCOSE: 234 MG/DL (ref 70–110)
POCT GLUCOSE: 264 MG/DL (ref 70–110)
POCT GLUCOSE: 300 MG/DL (ref 70–110)
POCT GLUCOSE: 381 MG/DL (ref 70–110)
POTASSIUM SERPL-SCNC: 3.5 MMOL/L (ref 3.5–5.1)
POTASSIUM SERPL-SCNC: 3.7 MMOL/L (ref 3.5–5.1)
POTASSIUM SERPL-SCNC: 3.8 MMOL/L (ref 3.5–5.1)
POTASSIUM SERPL-SCNC: 4.1 MMOL/L (ref 3.5–5.1)
PROT SERPL-MCNC: 6.6 G/DL (ref 6–8.4)
PROT SERPL-MCNC: 6.9 G/DL (ref 6–8.4)
RBC # BLD AUTO: 4.8 M/UL (ref 4–5.4)
SATURATED IRON: 11 % (ref 20–50)
SODIUM SERPL-SCNC: 132 MMOL/L (ref 136–145)
SODIUM SERPL-SCNC: 133 MMOL/L (ref 136–145)
SODIUM SERPL-SCNC: 134 MMOL/L (ref 136–145)
SODIUM SERPL-SCNC: 137 MMOL/L (ref 136–145)
TOTAL IRON BINDING CAPACITY: 386 UG/DL (ref 250–450)
TRANSFERRIN SERPL-MCNC: 261 MG/DL (ref 200–375)
TRIGL SERPL-MCNC: 303 MG/DL (ref 30–150)
WBC # BLD AUTO: 6 K/UL (ref 3.9–12.7)

## 2024-12-23 PROCEDURE — 80048 BASIC METABOLIC PNL TOTAL CA: CPT

## 2024-12-23 PROCEDURE — 25000003 PHARM REV CODE 250: Performed by: STUDENT IN AN ORGANIZED HEALTH CARE EDUCATION/TRAINING PROGRAM

## 2024-12-23 PROCEDURE — 25000003 PHARM REV CODE 250

## 2024-12-23 PROCEDURE — 84100 ASSAY OF PHOSPHORUS: CPT

## 2024-12-23 PROCEDURE — 63600175 PHARM REV CODE 636 W HCPCS: Performed by: STUDENT IN AN ORGANIZED HEALTH CARE EDUCATION/TRAINING PROGRAM

## 2024-12-23 PROCEDURE — 36415 COLL VENOUS BLD VENIPUNCTURE: CPT | Mod: XB

## 2024-12-23 PROCEDURE — 82728 ASSAY OF FERRITIN: CPT

## 2024-12-23 PROCEDURE — 83540 ASSAY OF IRON: CPT

## 2024-12-23 PROCEDURE — 83735 ASSAY OF MAGNESIUM: CPT

## 2024-12-23 PROCEDURE — 80048 BASIC METABOLIC PNL TOTAL CA: CPT | Mod: 91

## 2024-12-23 PROCEDURE — 20600001 HC STEP DOWN PRIVATE ROOM

## 2024-12-23 PROCEDURE — 80061 LIPID PANEL: CPT

## 2024-12-23 PROCEDURE — 85025 COMPLETE CBC W/AUTO DIFF WBC: CPT

## 2024-12-23 PROCEDURE — 80053 COMPREHEN METABOLIC PANEL: CPT | Mod: 91

## 2024-12-23 RX ORDER — INSULIN ASPART 100 [IU]/ML
8 INJECTION, SOLUTION INTRAVENOUS; SUBCUTANEOUS
Qty: 15 ML | Refills: 3 | Status: CANCELLED | OUTPATIENT
Start: 2024-12-23 | End: 2025-12-23

## 2024-12-23 RX ORDER — INSULIN GLARGINE 100 [IU]/ML
30 INJECTION, SOLUTION SUBCUTANEOUS NIGHTLY
Status: DISCONTINUED | OUTPATIENT
Start: 2024-12-23 | End: 2024-12-24

## 2024-12-23 RX ORDER — INSULIN ASPART 100 [IU]/ML
11 INJECTION, SOLUTION INTRAVENOUS; SUBCUTANEOUS
Status: DISCONTINUED | OUTPATIENT
Start: 2024-12-23 | End: 2024-12-24

## 2024-12-23 RX ORDER — SODIUM,POTASSIUM PHOSPHATES 280-250MG
2 POWDER IN PACKET (EA) ORAL ONCE
Status: COMPLETED | OUTPATIENT
Start: 2024-12-23 | End: 2024-12-23

## 2024-12-23 RX ORDER — AMLODIPINE BESYLATE 10 MG/1
10 TABLET ORAL DAILY
Qty: 90 TABLET | Refills: 3 | Status: CANCELLED | OUTPATIENT
Start: 2024-12-23 | End: 2025-12-23

## 2024-12-23 RX ORDER — METFORMIN HYDROCHLORIDE 500 MG/1
500 TABLET ORAL 2 TIMES DAILY WITH MEALS
Qty: 60 TABLET | Refills: 3 | Status: CANCELLED | OUTPATIENT
Start: 2024-12-23

## 2024-12-23 RX ORDER — POTASSIUM CHLORIDE 20 MEQ/1
40 TABLET, EXTENDED RELEASE ORAL ONCE
Status: COMPLETED | OUTPATIENT
Start: 2024-12-23 | End: 2024-12-23

## 2024-12-23 RX ORDER — METFORMIN HYDROCHLORIDE 500 MG/1
500 TABLET ORAL 2 TIMES DAILY WITH MEALS
Start: 2024-12-23

## 2024-12-23 RX ORDER — INSULIN GLARGINE 100 [IU]/ML
25 INJECTION, SOLUTION SUBCUTANEOUS NIGHTLY
Qty: 15 ML | Refills: 3 | Status: CANCELLED | OUTPATIENT
Start: 2024-12-23 | End: 2025-12-23

## 2024-12-23 RX ORDER — LOSARTAN POTASSIUM 50 MG/1
50 TABLET ORAL DAILY
Status: DISCONTINUED | OUTPATIENT
Start: 2024-12-24 | End: 2024-12-24 | Stop reason: HOSPADM

## 2024-12-23 RX ORDER — POTASSIUM CHLORIDE 20 MEQ/1
20 TABLET, EXTENDED RELEASE ORAL ONCE
Status: COMPLETED | OUTPATIENT
Start: 2024-12-23 | End: 2024-12-23

## 2024-12-23 RX ORDER — PEN NEEDLE, DIABETIC 30 GX3/16"
NEEDLE, DISPOSABLE MISCELLANEOUS
Qty: 100 EACH | Refills: 11 | Status: SHIPPED | OUTPATIENT
Start: 2024-12-23

## 2024-12-23 RX ORDER — LOSARTAN POTASSIUM 50 MG/1
50 TABLET ORAL DAILY
Qty: 90 TABLET | Refills: 3 | Status: SHIPPED | OUTPATIENT
Start: 2024-12-24 | End: 2025-12-24

## 2024-12-23 RX ADMIN — INSULIN ASPART 8 UNITS: 100 INJECTION, SOLUTION INTRAVENOUS; SUBCUTANEOUS at 01:12

## 2024-12-23 RX ADMIN — INSULIN ASPART 5 UNITS: 100 INJECTION, SOLUTION INTRAVENOUS; SUBCUTANEOUS at 06:12

## 2024-12-23 RX ADMIN — POTASSIUM CHLORIDE 40 MEQ: 1500 TABLET, EXTENDED RELEASE ORAL at 08:12

## 2024-12-23 RX ADMIN — INSULIN ASPART 3 UNITS: 100 INJECTION, SOLUTION INTRAVENOUS; SUBCUTANEOUS at 09:12

## 2024-12-23 RX ADMIN — INSULIN ASPART 8 UNITS: 100 INJECTION, SOLUTION INTRAVENOUS; SUBCUTANEOUS at 08:12

## 2024-12-23 RX ADMIN — POTASSIUM CHLORIDE 20 MEQ: 1500 TABLET, EXTENDED RELEASE ORAL at 01:12

## 2024-12-23 RX ADMIN — INSULIN GLARGINE 30 UNITS: 100 INJECTION, SOLUTION SUBCUTANEOUS at 09:12

## 2024-12-23 RX ADMIN — AMLODIPINE BESYLATE 10 MG: 10 TABLET ORAL at 08:12

## 2024-12-23 RX ADMIN — POTASSIUM & SODIUM PHOSPHATES POWDER PACK 280-160-250 MG 2 PACKET: 280-160-250 PACK at 11:12

## 2024-12-23 RX ADMIN — SODIUM CHLORIDE: 9 INJECTION, SOLUTION INTRAVENOUS at 05:12

## 2024-12-23 RX ADMIN — INSULIN ASPART 3 UNITS: 100 INJECTION, SOLUTION INTRAVENOUS; SUBCUTANEOUS at 01:12

## 2024-12-23 RX ADMIN — INSULIN ASPART 11 UNITS: 100 INJECTION, SOLUTION INTRAVENOUS; SUBCUTANEOUS at 06:12

## 2024-12-23 RX ADMIN — INSULIN ASPART 3 UNITS: 100 INJECTION, SOLUTION INTRAVENOUS; SUBCUTANEOUS at 08:12

## 2024-12-23 NOTE — PLAN OF CARE
AAOx3, afebrile, w/o c/o pain. Tele monitor in place (sinus todd-NSR). Pt's blood sugar were being monitored q1h between 2217-4200 this shift. 0000 BMP reviewed with Dr. Mason. Pt's blood sugar checks are now achs. Lantus given @2044 and insulin gtt stopped 2 hours after Lantus was given per Dr. Matos. Meal time insulin ordered for the am. D51/2 NS gtt d/c and pt is now on NS @125cc/hr. BMP being monitored daily now. IV K, IV Mg and potassium sodium phosphate packets given. Dr. Matos only wanted 3/5 IV K to be given. Pt stated she has been on her period x1 month. Pt able to position self independently. Pt ambulated in the hallways. Pt in lowest position, side rails up x2, non-skid foot wear in place, call light within reach, pt verbalized understanding to call RN when needed. Hand hygiene practiced per protocol. Will continue to monitor.

## 2024-12-23 NOTE — PLAN OF CARE
Insulin gtt discontinued overnight - patient transitioned to SQ Levemir. Novolog started with meals -  to 300 this shift. Patient educated on insulin administration and was able to correctly demonstrate how to draw up and administer insulin. Potassium 3.7 - replaced PO. Phosphorus 2.2 - replaced with Kphos. Patient up ad cali - ambulating in hallway several times this shift.

## 2024-12-23 NOTE — ASSESSMENT & PLAN NOTE
29yF presented following 1 month of polyuria and polydipsia to the point of interfering with work. Reports compliance with home metformin 500 BID. Previously on insulin but stopped in 2021. A1c 5.6 1 year ago, 12.0 on admit. Does not check glucose at home. Symptoms began following GI infection. No current respiratory or GI infections. No wounds. pH 7.29 on admit vbg. AG 17.     - on DKA pathway  - insulin drip stopped, started on 25U glargine daily & 8U lantus TID  - IVF stopped  - replete K as needed  - diet resumed  - strict ins/outs  - providing diabetic education

## 2024-12-23 NOTE — ASSESSMENT & PLAN NOTE
Patient's blood pressure range in the last 24 hours was: BP  Min: 120/76  Max: 185/86.The patient's inpatient anti-hypertensive regimen is listed below:  Current Antihypertensives  losartan tablet 50 mg, Daily, Oral  losartan (COZAAR) tablet, Daily, Oral    BP elevated on admit. Missed dose on day of admission, but generally adherent to amlodipine 10mg daily. Does not take BP at home.    Plan  - transition from amlodipine 10 to losartan 50  - adjust regimen as needed for appropriate BP control

## 2024-12-23 NOTE — NURSING
Pt had a weight increase this am. Pt's weight was 152.7kg yesterday afternoon. Pt's weight this am was 154.2kg. Dr. Mason notified. MD stated it most likely from the IV fluids she has received. MD is okay with the weight increase as long as pt is urinating. See flowsheets for output. Will continue to monitor.

## 2024-12-23 NOTE — PLAN OF CARE
Patient AAOx4. OOB independently. VSS. Diabetic diet. q1 accu checks. Tele NSR. D5 0.45 NS infusing @ 125 ml/hr. K+ 3.1, K+ 10 mEq IVPB infusing at this time. Insulin infusing continuous @ 0.05 u/kg/hr. BNP to be drawn for 2000. Bed lowest setting, locked, 2x rails up, call light within reach, pt verbalized use.

## 2024-12-23 NOTE — PROGRESS NOTES
[facility]  Progress Note    Patient Name: Lisbeth Moran  MRN: [id number]  Patient Class: IP- Inpatient   Admission Date: 12/22/2024  Length of Stay: 1 days  Attending Physician: Charles Burgess MD  Primary Care Provider: Stacey Scherer MD        Subjective     Principal Problem:Diabetic ketoacidosis without coma associated with type 2 diabetes mellitus        HPI:  Lisbeth Moran is a 29-year-old woman with HTN, PCOS and DMT2 who presented to the ED for polyuria and polydipsia for the past month which acutely worsened over the last 3 days. 1 month ago she had a brief GI infection which was self-limited. After she recovered she began noticing increased thirst and urination. Recently it has become difficult to work due to frequency of urination. She denies any nausea, vomiting, diarrhea, SOB, fevers, chills, or headaches. No recents illness or wounds. Appetite normal. She reports compliance with home metformin 500 BID and amlodipine 10mg daily but does not check blood sugar or blood pressure at home. She missed her morning doses when presenting to the ED. She notes a prior hospitalization for DKA (2021) and previous insulin use, but was transitioned to metformin in 2021 with no issues since.     In the ED, vitals found T 98.6F, /111, and HR 86. Lab work found glucose >500, 3+ ketones in the urine, elevated beta-hydroxyurea, VBG with pH 7.29 and AG of 17; consistent with mild DKA. Creatinine 1.1 from baseline 0.9. A1c 1 year ago 5.7, now 12.0. She received 1L LR. She will be admitted to hospital medicine for management of DKA.    Overview/Hospital Course:  Admitted to hospital medicine for management of DKA. On day of admit BG reached <200 and insulin drip was able to be stopped. Started on long and short acting insulin. Amlodipine transitioned to losartan. Tolerated PO well.    Interval History: Glucose <200 yesterday evening. Diet resumed. Insulin drip stopped and  started on 25U glargine and 8U TID lantus. Symptomatically improved with resolved polyuria and polydipsia.     Review of Systems   Constitutional:  Negative for activity change, chills and fever.   Respiratory:  Negative for chest tightness and shortness of breath.    Cardiovascular:  Negative for chest pain and leg swelling.   Gastrointestinal:  Negative for abdominal pain, diarrhea, nausea and vomiting.   Endocrine: Negative for polydipsia and polyuria.   Genitourinary:  Negative for dysuria, hematuria and urgency.   Skin:  Negative for wound.   Neurological:  Negative for dizziness and headaches.     Objective:     Vital Signs (Most Recent):  Temp: 97.8 °F (36.6 °C) (12/23/24 1155)  Pulse: 89 (12/23/24 1100)  Resp: 16 (12/23/24 1155)  BP: 120/76 (12/23/24 1155)  SpO2: 99 % (12/23/24 0749) Vital Signs (24h Range):  Temp:  [97.8 °F (36.6 °C)-98.5 °F (36.9 °C)] 97.8 °F (36.6 °C)  Pulse:  [56-89] 89  Resp:  [16-18] 16  SpO2:  [97 %-100 %] 99 %  BP: (120-185)/() 120/76     Weight: (!) 154.2 kg (339 lb 15.2 oz)  Body mass index is 47.41 kg/m².    Intake/Output Summary (Last 24 hours) at 12/23/2024 1232  Last data filed at 12/23/2024 0620  Gross per 24 hour   Intake 2266.1 ml   Output 1625 ml   Net 641.1 ml         Physical Exam  Constitutional:       General: She is not in acute distress.     Appearance: She is obese. She is not ill-appearing.   Eyes:      General: No scleral icterus.     Conjunctiva/sclera: Conjunctivae normal.   Cardiovascular:      Rate and Rhythm: Normal rate and regular rhythm.      Heart sounds: Normal heart sounds.   Pulmonary:      Effort: Pulmonary effort is normal. No respiratory distress.      Breath sounds: Normal breath sounds.   Abdominal:      General: Abdomen is flat.      Palpations: Abdomen is soft.      Tenderness: There is no abdominal tenderness.   Musculoskeletal:      Right lower leg: No edema.      Left lower leg: No edema.   Skin:     General: Skin is warm.    Neurological:      General: No focal deficit present.      Mental Status: She is alert and oriented to person, place, and time.   Psychiatric:         Mood and Affect: Mood normal.         Thought Content: Thought content normal.             Significant Labs: All pertinent labs within the past 24 hours have been reviewed.  CBC:   Recent Labs   Lab 12/22/24  0916 12/23/24  0806   WBC 7.87 6.00   HGB 12.3 12.2   HCT 39.6 38.3    412     CMP:   Recent Labs   Lab 12/22/24  0916 12/22/24  1312 12/22/24  1941 12/23/24  0007 12/23/24  0806   *   < > 136 137 132*   K 3.6   < > 4.2 3.7 3.5   CL 99   < > 103 104 103   CO2 17*   < > 21* 21* 18*   *   < > 224* 148* 256*   BUN 11   < > 8 8 7   CREATININE 1.1   < > 0.9 0.8 0.8   CALCIUM 9.3   < > 8.4* 8.4* 8.3*   PROT 8.1  --   --   --   --    ALBUMIN 3.8  --   --   --   --    BILITOT 0.3  --   --   --   --    ALKPHOS 203*  --   --   --   --    AST 19  --   --   --   --    ALT 34  --   --   --   --    ANIONGAP 17*   < > 12 12 11    < > = values in this interval not displayed.       Significant Imaging: I have reviewed all pertinent imaging results/findings within the past 24 hours.    Assessment and Plan     * Diabetic ketoacidosis without coma associated with type 2 diabetes mellitus  29yF presented following 1 month of polyuria and polydipsia to the point of interfering with work. Reports compliance with home metformin 500 BID. Previously on insulin but stopped in 2021. A1c 5.6 1 year ago, 12.0 on admit. Does not check glucose at home. Symptoms began following GI infection. No current respiratory or GI infections. No wounds. pH 7.29 on admit vbg. AG 17.     - on DKA pathway  - insulin drip stopped, started on 25U glargine daily & 8U lantus TID  - IVF stopped  - replete K as needed  - diet resumed  - strict ins/outs  - providing diabetic education      Primary hypertension  Patient's blood pressure range in the last 24 hours was: BP  Min: 120/76  Max:  185/86.The patient's inpatient anti-hypertensive regimen is listed below:  Current Antihypertensives  losartan tablet 50 mg, Daily, Oral  losartan (COZAAR) tablet, Daily, Oral    BP elevated on admit. Missed dose on day of admission, but generally adherent to amlodipine 10mg daily. Does not take BP at home.    Plan  - transition from amlodipine 10 to losartan 50  - adjust regimen as needed for appropriate BP control    Morbid obesity  Body mass index is 45.47 kg/m². Morbid obesity complicates all aspects of disease management from diagnostic modalities to treatment. Weight loss encouraged and health benefits explained to patient.         Type 2 diabetes mellitus with hyperglycemia, without long-term current use of insulin  See DKA        VTE Risk Mitigation (From admission, onward)           Ordered     enoxaparin injection 40 mg  Every 12 hours         12/22/24 1214     IP VTE HIGH RISK PATIENT  Once         12/22/24 1214     Place sequential compression device  Until discontinued         12/22/24 1214                    Discharge Planning   SUSANNAH: 12/24/2024     Code Status: Full Code   Medical Readiness for Discharge Date:   Discharge Plan A: Home with family                        Tata Maier MD  Department of Hospital Medicine   [facility]

## 2024-12-23 NOTE — HOSPITAL COURSE
Admitted to hospital medicine for management of DKA. Anion gap closed shortly after placed on insulin gtt and was able to transition to subQ that night. Remained in the hospital for titration of insulin due to hyperglycemia. Had previously been on insulin which was stopped and placed on metformin, on discharge will continue on insulin with glargine 34 units daily and aspart 15 units three times daily with meals. Amlodipine discontinued and placed back on losartan for renal protection.

## 2024-12-23 NOTE — PLAN OF CARE
[facility]  Initial Discharge Assessment       Primary Care Provider: Stacey Scherer MD    Admission Diagnosis: Hyperglycemia [R73.9]  Chest pain [R07.9]  Diabetic ketoacidosis without coma associated with type 2 diabetes mellitus [E11.10]    Admission Date: 12/22/2024  Expected Discharge Date: 12/24/2024    Transition of Care Barriers: Unisured    Payor: /     Extended Emergency Contact Information  Primary Emergency Contact: Jailene Moran   [place]  Mobile Phone: [phone]  Relation: Sister  Secondary Emergency Contact: CRISSY VALLADARES   [facility]  Home Phone: [phone]  Relation: Significant other    Discharge Plan A: Home with family  Discharge Plan B: Home, Home Health    No Pharmacies Listed    Initial Assessment (most recent)       Adult Discharge Assessment - 12/23/24 0956          Discharge Assessment    Assessment Type Discharge Planning Assessment     Confirmed/corrected address, phone number and insurance Yes     Confirmed Demographics Correct on Facesheet     Source of Information family     Does patient/caregiver understand observation status Yes     Communicated SUSANNAH with patient/caregiver Yes     Reason For Admission DKA without coma associated with DM2     People in Home significant other     Do you expect to return to your current living situation? Yes     Do you have help at home or someone to help you manage your care at home? Yes     Who are your caregiver(s) and their phone number(s)? Jailene Moran (sister) [phone]     Prior to hospitilization cognitive status: Alert/Oriented;No Deficits     Current cognitive status: Alert/Oriented;No Deficits     Walking or Climbing Stairs Difficulty no     Dressing/Bathing Difficulty no     Home Accessibility not wheelchair accessible     Home Layout Able to live on 1st floor     Equipment Currently Used at Home none     Readmission within 30 days? No     Patient currently being followed by  outpatient case management? No     Do you currently have service(s) that help you manage your care at home? No     Do you take prescription medications? Yes     Do you have prescription coverage? No     Do you have any problems affording any of your prescribed medications? Yes     If yes, what medications? sister was not sure     Is the patient taking medications as prescribed? no     If no, which medications is patient not taking? sister not sure     Who is going to help you get home at discharge? Jailene Moran (sister)     How do you get to doctors appointments? car, drives self     Are you on dialysis? No     Do you take coumadin? No     Discharge Plan A Home with family     Discharge Plan B Home;Home Health     DME Needed Upon Discharge  none     Discharge Plan discussed with: Sibling     Name(s) and Number(s) Jailene Moran (sister) [phone]     Transition of Care Barriers Unisured        Financial Resource Strain    How hard is it for you to pay for the very basics like food, housing, medical care, and heating? Not hard at all        Housing Stability    In the last 12 months, was there a time when you were not able to pay the mortgage or rent on time? No     At any time in the past 12 months, were you homeless or living in a shelter (including now)? No        Transportation Needs    Has the lack of transportation kept you from medical appointments, meetings, work or from getting things needed for daily living? No        Food Insecurity    Within the past 12 months, you worried that your food would run out before you got the money to buy more. Never true     Within the past 12 months, the food you bought just didn't last and you didn't have money to get more. Never true        Stress    Do you feel stress - tense, restless, nervous, or anxious, or unable to sleep at night because your mind is troubled all the time - these days? Not at all        Social Isolation    How often do you feel lonely or isolated  from those around you?  Never        Alcohol Use    Q1: How often do you have a drink containing alcohol? Never     Q2: How many drinks containing alcohol do you have on a typical day when you are drinking? Patient does not drink     Q3: How often do you have six or more drinks on one occasion? Never        Utilities    In the past 12 months has the electric, gas, oil, or water company threatened to shut off services in your home? No        Health Literacy    How often do you need to have someone help you when you read instructions, pamphlets, or other written material from your doctor or pharmacy? Never        OTHER    Name(s) of People in Home Maicol Rajan (significant other)                      CM spoke with patient's sister Jailene Moran via phone [phone]. All information was verified on facesheet. Patient lives in a 1st floor apartment with significant other, with a dog. Jailene states no assistance was needed previously. Patient can ambulate, drive, run errands, and complete ADL's independently. Patient does not use any DME or any in-home assistive equipment. Patient has not used Home Health previously. Patient is not on dialysis nor use Coumadin as a blood thinner. Patient takes medication as prescribed and has resources for all prescriptive needs. Patient will have help from family member upon discharge. Family will provide transportation home. All Questions and concerns addressed and whiteboard updated with CM contact information. Will continue to follow for course of hospitalization.     Discharge Plan A and Plan B have been determined by review of patient's clinical status, future medical and therapeutic needs, and coverage/benefits for post-acute care in coordination with multidisciplinary team members.     Gwen Dewey RN, BSN  Case Management  [phone]

## 2024-12-23 NOTE — PLAN OF CARE
CM received an email from Luly Maguire  Supervisor- Patient Financial Service, patient refused screening for Medicaid.     Gwen Dewey, RN, BSN  Case Management  [phone]

## 2024-12-23 NOTE — NURSING
Pt felt a little weird this am. Spot checked pt's bg was 234. Will not cover pt due to breakfast being in a 1.5 hours and she will need insulin when she eats.

## 2024-12-23 NOTE — PLAN OF CARE
CM sent email to [facility] to screen for MCAID. CM will continue to follow.     Gwen Dewey RN, BSN  Case Management  [phone]

## 2024-12-23 NOTE — SUBJECTIVE & OBJECTIVE
Interval History: Glucose <200 yesterday evening. Diet resumed. Insulin drip stopped and started on 25U glargine and 8U TID lantus. Symptomatically improved with resolved polyuria and polydipsia.     Review of Systems   Constitutional:  Negative for activity change, chills and fever.   Respiratory:  Negative for chest tightness and shortness of breath.    Cardiovascular:  Negative for chest pain and leg swelling.   Gastrointestinal:  Negative for abdominal pain, diarrhea, nausea and vomiting.   Endocrine: Negative for polydipsia and polyuria.   Genitourinary:  Negative for dysuria, hematuria and urgency.   Skin:  Negative for wound.   Neurological:  Negative for dizziness and headaches.     Objective:     Vital Signs (Most Recent):  Temp: 97.8 °F (36.6 °C) (12/23/24 1155)  Pulse: 89 (12/23/24 1100)  Resp: 16 (12/23/24 1155)  BP: 120/76 (12/23/24 1155)  SpO2: 99 % (12/23/24 0749) Vital Signs (24h Range):  Temp:  [97.8 °F (36.6 °C)-98.5 °F (36.9 °C)] 97.8 °F (36.6 °C)  Pulse:  [56-89] 89  Resp:  [16-18] 16  SpO2:  [97 %-100 %] 99 %  BP: (120-185)/() 120/76     Weight: (!) 154.2 kg (339 lb 15.2 oz)  Body mass index is 47.41 kg/m².    Intake/Output Summary (Last 24 hours) at 12/23/2024 1232  Last data filed at 12/23/2024 0620  Gross per 24 hour   Intake 2266.1 ml   Output 1625 ml   Net 641.1 ml         Physical Exam  Constitutional:       General: She is not in acute distress.     Appearance: She is obese. She is not ill-appearing.   Eyes:      General: No scleral icterus.     Conjunctiva/sclera: Conjunctivae normal.   Cardiovascular:      Rate and Rhythm: Normal rate and regular rhythm.      Heart sounds: Normal heart sounds.   Pulmonary:      Effort: Pulmonary effort is normal. No respiratory distress.      Breath sounds: Normal breath sounds.   Abdominal:      General: Abdomen is flat.      Palpations: Abdomen is soft.      Tenderness: There is no abdominal tenderness.   Musculoskeletal:      Right lower leg:  No edema.      Left lower leg: No edema.   Skin:     General: Skin is warm.   Neurological:      General: No focal deficit present.      Mental Status: She is alert and oriented to person, place, and time.   Psychiatric:         Mood and Affect: Mood normal.         Thought Content: Thought content normal.             Significant Labs: All pertinent labs within the past 24 hours have been reviewed.  CBC:   Recent Labs   Lab 12/22/24  0916 12/23/24  0806   WBC 7.87 6.00   HGB 12.3 12.2   HCT 39.6 38.3    412     CMP:   Recent Labs   Lab 12/22/24  0916 12/22/24  1312 12/22/24  1941 12/23/24  0007 12/23/24  0806   *   < > 136 137 132*   K 3.6   < > 4.2 3.7 3.5   CL 99   < > 103 104 103   CO2 17*   < > 21* 21* 18*   *   < > 224* 148* 256*   BUN 11   < > 8 8 7   CREATININE 1.1   < > 0.9 0.8 0.8   CALCIUM 9.3   < > 8.4* 8.4* 8.3*   PROT 8.1  --   --   --   --    ALBUMIN 3.8  --   --   --   --    BILITOT 0.3  --   --   --   --    ALKPHOS 203*  --   --   --   --    AST 19  --   --   --   --    ALT 34  --   --   --   --    ANIONGAP 17*   < > 12 12 11    < > = values in this interval not displayed.       Significant Imaging: I have reviewed all pertinent imaging results/findings within the past 24 hours.

## 2024-12-23 NOTE — PROGRESS NOTES
Insulin pen teaching done this AM - patient able to correctly draw up and self-administer Novolog for breakfast. Will assess patient administer insulin independently for lunch.

## 2024-12-24 VITALS
OXYGEN SATURATION: 99 % | HEART RATE: 80 BPM | SYSTOLIC BLOOD PRESSURE: 135 MMHG | DIASTOLIC BLOOD PRESSURE: 86 MMHG | RESPIRATION RATE: 16 BRPM | TEMPERATURE: 98 F | HEIGHT: 71 IN | BODY MASS INDEX: 41.02 KG/M2 | WEIGHT: 293 LBS

## 2024-12-24 LAB
ALBUMIN SERPL BCP-MCNC: 3.5 G/DL (ref 3.5–5.2)
ALP SERPL-CCNC: 148 U/L (ref 40–150)
ALT SERPL W/O P-5'-P-CCNC: 42 U/L (ref 10–44)
ANION GAP SERPL CALC-SCNC: 10 MMOL/L (ref 8–16)
AST SERPL-CCNC: 34 U/L (ref 10–40)
BASOPHILS # BLD AUTO: 0 K/UL (ref 0–0.2)
BASOPHILS NFR BLD: 0 % (ref 0–1.9)
BILIRUB SERPL-MCNC: 0.4 MG/DL (ref 0.1–1)
BUN SERPL-MCNC: 6 MG/DL (ref 6–20)
CALCIUM SERPL-MCNC: 8.9 MG/DL (ref 8.7–10.5)
CHLORIDE SERPL-SCNC: 105 MMOL/L (ref 95–110)
CO2 SERPL-SCNC: 20 MMOL/L (ref 23–29)
CREAT SERPL-MCNC: 0.8 MG/DL (ref 0.5–1.4)
DIFFERENTIAL METHOD BLD: ABNORMAL
EOSINOPHIL # BLD AUTO: 0.2 K/UL (ref 0–0.5)
EOSINOPHIL NFR BLD: 3.5 % (ref 0–8)
ERYTHROCYTE [DISTWIDTH] IN BLOOD BY AUTOMATED COUNT: 17.1 % (ref 11.5–14.5)
EST. GFR  (NO RACE VARIABLE): >60 ML/MIN/1.73 M^2
GLUCOSE SERPL-MCNC: 260 MG/DL (ref 70–110)
HCT VFR BLD AUTO: 36.4 % (ref 37–48.5)
HGB BLD-MCNC: 11.5 G/DL (ref 12–16)
IMM GRANULOCYTES # BLD AUTO: 0.01 K/UL (ref 0–0.04)
IMM GRANULOCYTES NFR BLD AUTO: 0.2 % (ref 0–0.5)
LYMPHOCYTES # BLD AUTO: 2.9 K/UL (ref 1–4.8)
LYMPHOCYTES NFR BLD: 50.1 % (ref 18–48)
MAGNESIUM SERPL-MCNC: 1.7 MG/DL (ref 1.6–2.6)
MCH RBC QN AUTO: 25.1 PG (ref 27–31)
MCHC RBC AUTO-ENTMCNC: 31.6 G/DL (ref 32–36)
MCV RBC AUTO: 79 FL (ref 82–98)
MONOCYTES # BLD AUTO: 0.6 K/UL (ref 0.3–1)
MONOCYTES NFR BLD: 9.8 % (ref 4–15)
NEUTROPHILS # BLD AUTO: 2.1 K/UL (ref 1.8–7.7)
NEUTROPHILS NFR BLD: 36.4 % (ref 38–73)
NRBC BLD-RTO: 0 /100 WBC
PHOSPHATE SERPL-MCNC: 2.6 MG/DL (ref 2.7–4.5)
PLATELET # BLD AUTO: 409 K/UL (ref 150–450)
PMV BLD AUTO: 10 FL (ref 9.2–12.9)
POCT GLUCOSE: 261 MG/DL (ref 70–110)
POCT GLUCOSE: 306 MG/DL (ref 70–110)
POCT GLUCOSE: 324 MG/DL (ref 70–110)
POCT GLUCOSE: 437 MG/DL (ref 70–110)
POCT GLUCOSE: >500 MG/DL (ref 70–110)
POTASSIUM SERPL-SCNC: 3.7 MMOL/L (ref 3.5–5.1)
PROT SERPL-MCNC: 7.1 G/DL (ref 6–8.4)
RBC # BLD AUTO: 4.59 M/UL (ref 4–5.4)
SODIUM SERPL-SCNC: 135 MMOL/L (ref 136–145)
WBC # BLD AUTO: 5.71 K/UL (ref 3.9–12.7)

## 2024-12-24 PROCEDURE — 63600175 PHARM REV CODE 636 W HCPCS: Performed by: STUDENT IN AN ORGANIZED HEALTH CARE EDUCATION/TRAINING PROGRAM

## 2024-12-24 PROCEDURE — 80053 COMPREHEN METABOLIC PANEL: CPT

## 2024-12-24 PROCEDURE — 85025 COMPLETE CBC W/AUTO DIFF WBC: CPT

## 2024-12-24 PROCEDURE — 36415 COLL VENOUS BLD VENIPUNCTURE: CPT

## 2024-12-24 PROCEDURE — 84100 ASSAY OF PHOSPHORUS: CPT

## 2024-12-24 PROCEDURE — 63600175 PHARM REV CODE 636 W HCPCS

## 2024-12-24 PROCEDURE — 83735 ASSAY OF MAGNESIUM: CPT

## 2024-12-24 PROCEDURE — 25000003 PHARM REV CODE 250

## 2024-12-24 PROCEDURE — 25000003 PHARM REV CODE 250: Performed by: STUDENT IN AN ORGANIZED HEALTH CARE EDUCATION/TRAINING PROGRAM

## 2024-12-24 RX ORDER — INSULIN ASPART 100 [IU]/ML
0-10 INJECTION, SOLUTION INTRAVENOUS; SUBCUTANEOUS
Qty: 10 ML | Refills: 3 | Status: SHIPPED | OUTPATIENT
Start: 2024-12-24 | End: 2024-12-24 | Stop reason: HOSPADM

## 2024-12-24 RX ORDER — INSULIN ASPART 100 [IU]/ML
15 INJECTION, SOLUTION INTRAVENOUS; SUBCUTANEOUS
Status: DISCONTINUED | OUTPATIENT
Start: 2024-12-24 | End: 2024-12-24 | Stop reason: HOSPADM

## 2024-12-24 RX ORDER — INSULIN GLARGINE 100 [IU]/ML
34 INJECTION, SOLUTION SUBCUTANEOUS NIGHTLY
Qty: 9 ML | Refills: 11 | Status: SHIPPED | OUTPATIENT
Start: 2024-12-24 | End: 2025-12-24

## 2024-12-24 RX ORDER — INSULIN ASPART 100 [IU]/ML
12 INJECTION, SOLUTION INTRAVENOUS; SUBCUTANEOUS
Status: DISCONTINUED | OUTPATIENT
Start: 2024-12-24 | End: 2024-12-24

## 2024-12-24 RX ORDER — SODIUM,POTASSIUM PHOSPHATES 280-250MG
2 POWDER IN PACKET (EA) ORAL ONCE
Status: DISCONTINUED | OUTPATIENT
Start: 2024-12-24 | End: 2024-12-24 | Stop reason: HOSPADM

## 2024-12-24 RX ORDER — INSULIN ASPART 100 [IU]/ML
0-10 INJECTION, SOLUTION INTRAVENOUS; SUBCUTANEOUS
Status: DISCONTINUED | OUTPATIENT
Start: 2024-12-24 | End: 2024-12-24 | Stop reason: HOSPADM

## 2024-12-24 RX ORDER — INSULIN ASPART 100 [IU]/ML
12 INJECTION, SOLUTION INTRAVENOUS; SUBCUTANEOUS 3 TIMES DAILY
Qty: 10.8 ML | Refills: 11 | Status: SHIPPED | OUTPATIENT
Start: 2024-12-24 | End: 2024-12-24

## 2024-12-24 RX ORDER — INSULIN ASPART 100 [IU]/ML
12 INJECTION, SOLUTION INTRAVENOUS; SUBCUTANEOUS
Qty: 15 ML | Refills: 6 | Status: SHIPPED | OUTPATIENT
Start: 2024-12-24 | End: 2024-12-24 | Stop reason: HOSPADM

## 2024-12-24 RX ORDER — INSULIN GLARGINE 100 [IU]/ML
34 INJECTION, SOLUTION SUBCUTANEOUS NIGHTLY
Status: DISCONTINUED | OUTPATIENT
Start: 2024-12-24 | End: 2024-12-24 | Stop reason: HOSPADM

## 2024-12-24 RX ORDER — MAGNESIUM SULFATE HEPTAHYDRATE 40 MG/ML
2 INJECTION, SOLUTION INTRAVENOUS ONCE
Status: COMPLETED | OUTPATIENT
Start: 2024-12-24 | End: 2024-12-24

## 2024-12-24 RX ORDER — INSULIN ASPART 100 [IU]/ML
15 INJECTION, SOLUTION INTRAVENOUS; SUBCUTANEOUS 3 TIMES DAILY
Qty: 12 ML | Refills: 11 | Status: SHIPPED | OUTPATIENT
Start: 2024-12-24 | End: 2025-12-24

## 2024-12-24 RX ADMIN — MAGNESIUM SULFATE HEPTAHYDRATE 2 G: 40 INJECTION, SOLUTION INTRAVENOUS at 12:12

## 2024-12-24 RX ADMIN — INSULIN ASPART 3 UNITS: 100 INJECTION, SOLUTION INTRAVENOUS; SUBCUTANEOUS at 08:12

## 2024-12-24 RX ADMIN — INSULIN ASPART 8 UNITS: 100 INJECTION, SOLUTION INTRAVENOUS; SUBCUTANEOUS at 12:12

## 2024-12-24 RX ADMIN — POTASSIUM BICARBONATE 30 MEQ: 391 TABLET, EFFERVESCENT ORAL at 12:12

## 2024-12-24 RX ADMIN — LOSARTAN POTASSIUM 50 MG: 50 TABLET, FILM COATED ORAL at 08:12

## 2024-12-24 RX ADMIN — INSULIN ASPART 12 UNITS: 100 INJECTION, SOLUTION INTRAVENOUS; SUBCUTANEOUS at 12:12

## 2024-12-24 RX ADMIN — INSULIN ASPART 11 UNITS: 100 INJECTION, SOLUTION INTRAVENOUS; SUBCUTANEOUS at 08:12

## 2024-12-24 RX ADMIN — ENOXAPARIN SODIUM 40 MG: 40 INJECTION SUBCUTANEOUS at 08:12

## 2024-12-24 NOTE — PROGRESS NOTES
[facility] - Transplant [facility] Medicine  Progress Note    Patient Name: Lisbeth Moran  MRN: [id number]  Patient Class: IP- Inpatient   Admission Date: 12/22/2024  Length of Stay: 2 days  Attending Physician: Charles Burgess MD  Primary Care Provider: Stacey Scherer MD        Subjective     Principal Problem:Diabetic ketoacidosis without coma associated with type 2 diabetes mellitus        HPI:  Lisbeth Moran is a 29-year-old woman with HTN, PCOS and DMT2 who presented to the ED for polyuria and polydipsia for the past month which acutely worsened over the last 3 days. 1 month ago she had a brief GI infection which was self-limited. After she recovered she began noticing increased thirst and urination. Recently it has become difficult to work due to frequency of urination. She denies any nausea, vomiting, diarrhea, SOB, fevers, chills, or headaches. No recents illness or wounds. Appetite normal. She reports compliance with home metformin 500 BID and amlodipine 10mg daily but does not check blood sugar or blood pressure at home. She missed her morning doses when presenting to the ED. She notes a prior hospitalization for DKA (2021) and previous insulin use, but was transitioned to metformin in 2021 with no issues since.     In the ED, vitals found T 98.6F, /111, and HR 86. Lab work found glucose >500, 3+ ketones in the urine, elevated beta-hydroxyurea, VBG with pH 7.29 and AG of 17; consistent with mild DKA. Creatinine 1.1 from baseline 0.9. A1c 1 year ago 5.7, now 12.0. She received 1L LR. She will be admitted to hospital medicine for management of DKA.    Overview/Hospital Course:  Admitted to hospital medicine for management of DKA. Anion gap closed shortly after placed on insulin gtt and was able to transition to subQ that night. Remained in the hospital for titration of insulin due to hyperglycemia. Had previously been on insulin which was stopped and placed on metformin, on discharge  will continue on insulin with glargine 34 units daily and aspart 12 three times daily with meals. Amlodipine discontinued and placed back on losartan for renal protection.    Interval History: NAEO. Feeling well today.    Review of Systems   Constitutional:  Negative for fever.   Respiratory:  Negative for shortness of breath.    Cardiovascular:  Negative for chest pain.   Gastrointestinal:  Negative for abdominal pain and vomiting.   Musculoskeletal:  Negative for gait problem.     Objective:     Vital Signs (Most Recent):  Temp: 98.2 °F (36.8 °C) (12/24/24 1127)  Pulse: 74 (12/24/24 1127)  Resp: 16 (12/24/24 1127)  BP: (!) 151/90 (12/24/24 1127)  SpO2: 98 % (12/24/24 1127) Vital Signs (24h Range):  Temp:  [97.5 °F (36.4 °C)-98.4 °F (36.9 °C)] 98.2 °F (36.8 °C)  Pulse:  [60-80] 74  Resp:  [16-18] 16  SpO2:  [93 %-100 %] 98 %  BP: (120-158)/(74-90) 151/90     Weight: (!) 154.2 kg (339 lb 15.2 oz)  Body mass index is 47.41 kg/m².    Intake/Output Summary (Last 24 hours) at 12/24/2024 1134  Last data filed at 12/24/2024 0409  Gross per 24 hour   Intake 1600 ml   Output 3000 ml   Net -1400 ml         Physical Exam  Vitals and nursing note reviewed.   Constitutional:       General: She is not in acute distress.     Appearance: She is obese. She is not toxic-appearing.   HENT:      Head: Normocephalic and atraumatic.   Eyes:      Extraocular Movements: Extraocular movements intact.      Pupils: Pupils are equal, round, and reactive to light.   Cardiovascular:      Rate and Rhythm: Normal rate and regular rhythm.      Heart sounds: No murmur heard.  Pulmonary:      Effort: Pulmonary effort is normal. No respiratory distress.      Breath sounds: No stridor. No wheezing or rhonchi.   Abdominal:      General: Abdomen is flat. There is no distension.   Musculoskeletal:      Cervical back: Normal range of motion and neck supple.   Skin:     General: Skin is warm and dry.   Neurological:      General: No focal deficit present.       Mental Status: She is alert and oriented to person, place, and time.   Psychiatric:         Mood and Affect: Mood normal.         Behavior: Behavior normal.         Thought Content: Thought content normal.         Judgment: Judgment normal.             Significant Labs: All pertinent labs within the past 24 hours have been reviewed.    Significant Imaging: I have reviewed all pertinent imaging results/findings within the past 24 hours.    Assessment and Plan     * Type 2 diabetes mellitus with hyperglycemia, without long-term current use of insulin  29F with T2DM, HLD and PCOS is admitted for DKA. Transitioned to subcutaneous the first night of admission, now titrating insulin for hyperglycemia.    - on DKA pathway  - increasing to glargine 34 units nightly and aspart 12 units TID  - replete K as needed  - diabetic diet  - providing diabetic education    Diabetic ketoacidosis without coma associated with type 2 diabetes mellitus  29yF presented following 1 month of polyuria and polydipsia to the point of interfering with work. Reports compliance with home metformin 500 BID. Previously on insulin but stopped in 2021. A1c 5.6 1 year ago, 12.0 on admit. Does not check glucose at home. Symptoms began following GI infection. No current respiratory or GI infections. No wounds. pH 7.29 on admit vbg. AG 17.     - DKA resolved, titrating subcutaneous insulin for hyperglycemia, see T2DM      Primary hypertension  Patient's blood pressure range in the last 24 hours was: BP  Min: 120/76  Max: 185/86.The patient's inpatient anti-hypertensive regimen is listed below:  Current Antihypertensives  losartan tablet 50 mg, Daily, Oral  losartan (COZAAR) tablet, Daily, Oral    BP elevated on admit. Missed dose on day of admission, but generally adherent to amlodipine 10mg daily. Does not take BP at home.    Plan  - transition from amlodipine 10 to losartan 50  - adjust regimen as needed for appropriate BP control    Morbid  obesity  Body mass index is 45.47 kg/m². Morbid obesity complicates all aspects of disease management from diagnostic modalities to treatment. Weight loss encouraged and health benefits explained to patient.           VTE Risk Mitigation (From admission, onward)           Ordered     enoxaparin injection 40 mg  Every 12 hours         12/22/24 1214     IP VTE HIGH RISK PATIENT  Once         12/22/24 1214     Place sequential compression device  Until discontinued         12/22/24 1214                    Discharge Planning   SUSANNAH: 12/24/2024     Code Status: Full Code   Medical Readiness for Discharge Date:   Discharge Plan A: Home with family                        Angelic Cannon DO  Department of Hospital Medicine   [facility] - Transplant Stepdown

## 2024-12-24 NOTE — ASSESSMENT & PLAN NOTE
29F with T2DM, HLD and PCOS is admitted for DKA. Transitioned to subcutaneous the first night of admission, now titrating insulin for hyperglycemia.    - on DKA pathway  - increasing to glargine 34 units nightly and aspart 15 units TID  - replete K as needed  - diabetic diet  - providing diabetic education

## 2024-12-24 NOTE — PLAN OF CARE
Pt is AAOx4, ambulatory independently. RA. Afebrile. VSS. Accucheck, AC&HS. BS greater than 500. Rechecked on other hand 437. Consult team. Team gave okay to administer 30 units Lantus with sliding scale of 3 units aspart. Insulin teaching and administration continued this shift. Pt demonstrated and verbalized understanding. Pt up voiding in hat (see flowsheets for details). Bed locked in lowest position with call light in reach. Pt instructed to call for any assistance.

## 2024-12-24 NOTE — ASSESSMENT & PLAN NOTE
29yF presented following 1 month of polyuria and polydipsia to the point of interfering with work. Reports compliance with home metformin 500 BID. Previously on insulin but stopped in 2021. A1c 5.6 1 year ago, 12.0 on admit. Does not check glucose at home. Symptoms began following GI infection. No current respiratory or GI infections. No wounds. pH 7.29 on admit vbg. AG 17.     - DKA resolved, titrating subcutaneous insulin for hyperglycemia, see T2DM

## 2024-12-24 NOTE — DISCHARGE SUMMARY
[facility] - Transplant [facility] Medicine  Discharge Summary      Patient Name: Lisbeth Moran  MRN: [id number]  MYRNA: [id number]  Patient Class: IP- Inpatient  Admission Date: 12/22/2024  Hospital Length of Stay: 2 days  Discharge Date and Time:  12/24/2024 4:20 PM  Attending Physician: Charles Burgess MD   Discharging Provider: Mack Hassan MD  Primary Care Provider: Stacey Scherer MD  Hospital Medicine Team: [facility] 1 Mack Hassan MD  Primary Care Team: [facility] 1    HPI:   Lisbeth Moran is a 29-year-old woman with HTN, PCOS and DMT2 who presented to the ED for polyuria and polydipsia for the past month which acutely worsened over the last 3 days. 1 month ago she had a brief GI infection which was self-limited. After she recovered she began noticing increased thirst and urination. Recently it has become difficult to work due to frequency of urination. She denies any nausea, vomiting, diarrhea, SOB, fevers, chills, or headaches. No recents illness or wounds. Appetite normal. She reports compliance with home metformin 500 BID and amlodipine 10mg daily but does not check blood sugar or blood pressure at home. She missed her morning doses when presenting to the ED. She notes a prior hospitalization for DKA (2021) and previous insulin use, but was transitioned to metformin in 2021 with no issues since.     In the ED, vitals found T 98.6F, /111, and HR 86. Lab work found glucose >500, 3+ ketones in the urine, elevated beta-hydroxyurea, VBG with pH 7.29 and AG of 17; consistent with mild DKA. Creatinine 1.1 from baseline 0.9. A1c 1 year ago 5.7, now 12.0. She received 1L LR. She will be admitted to hospital medicine for management of DKA.    * No surgery found *      Hospital Course:   Admitted to hospital medicine for management of DKA. Anion gap closed shortly after placed on insulin gtt and was able to transition to subQ that night. Remained in the hospital for titration of insulin  due to hyperglycemia. Had previously been on insulin which was stopped and placed on metformin, on discharge will continue on insulin with glargine 34 units daily and aspart 15 units three times daily with meals. Amlodipine discontinued and placed back on losartan for renal protection.     Goals of Care Treatment Preferences:  Code Status: Full Code      SDOH Screening:  The patient was screened for utility difficulties, food insecurity, transport difficulties, housing insecurity, and interpersonal safety and there were no concerns identified this admission.     Consults:   Consults (From admission, onward)          Status Ordering Provider     IP consult to case management  Once        Provider:  (Not yet assigned)    Acknowledged SKYLER DAVISON     Inpatient consult to Registered Dietitian/Nutritionist  Once        Provider:  (Not yet assigned)    Completed VITA POLLACK            * Type 2 diabetes mellitus with hyperglycemia, without long-term current use of insulin  29F with T2DM, HLD and PCOS is admitted for DKA. Transitioned to subcutaneous the first night of admission, now titrating insulin for hyperglycemia.    - on DKA pathway  - increasing to glargine 34 units nightly and aspart 15 units TID  - replete K as needed  - diabetic diet  - providing diabetic education    Diabetic ketoacidosis without coma associated with type 2 diabetes mellitus  29yF presented following 1 month of polyuria and polydipsia to the point of interfering with work. Reports compliance with home metformin 500 BID. Previously on insulin but stopped in 2021. A1c 5.6 1 year ago, 12.0 on admit. Does not check glucose at home. Symptoms began following GI infection. No current respiratory or GI infections. No wounds. pH 7.29 on admit vbg. AG 17.     - DKA resolved, titrating subcutaneous insulin for hyperglycemia, see T2DM      Primary hypertension  Patient's blood pressure range in the last 24 hours was: BP  Min: 120/76  Max: 185/86.The  patient's inpatient anti-hypertensive regimen is listed below:  Current Antihypertensives  losartan tablet 50 mg, Daily, Oral  losartan (COZAAR) tablet, Daily, Oral    BP elevated on admit. Missed dose on day of admission, but generally adherent to amlodipine 10mg daily. Does not take BP at home.    Plan  - transition from amlodipine 10 to losartan 50  - adjust regimen as needed for appropriate BP control    Morbid obesity  Body mass index is 45.47 kg/m². Morbid obesity complicates all aspects of disease management from diagnostic modalities to treatment. Weight loss encouraged and health benefits explained to patient.           Final Active Diagnoses:    Diagnosis Date Noted POA    PRINCIPAL PROBLEM:  Type 2 diabetes mellitus with hyperglycemia, without long-term current use of insulin [E11.65] 08/08/2022 Yes    Diabetic ketoacidosis without coma associated with type 2 diabetes mellitus [E11.10] 12/22/2024 Yes    Primary hypertension [I10] 08/08/2022 Yes    Morbid obesity [E66.01] 08/08/2022 Yes      Problems Resolved During this Admission:       Discharged Condition: stable    Disposition: Home or Self Care    Follow Up:    Patient Instructions:      Ambulatory referral/consult to Endocrinology   Standing Status: Future   Referral Priority: Routine Referral Type: Consultation   Requested Specialty: Endocrinology   Number of Visits Requested: 1     Diet diabetic     Notify your health care provider if you experience any of the following:  temperature >100.4     Notify your health care provider if you experience any of the following:  persistent nausea and vomiting or diarrhea     Notify your health care provider if you experience any of the following:  severe uncontrolled pain     Notify your health care provider if you experience any of the following:  redness, tenderness, or signs of infection (pain, swelling, redness, odor or green/yellow discharge around incision site)     Notify your health care provider if  you experience any of the following:  difficulty breathing or increased cough     Notify your health care provider if you experience any of the following:  severe persistent headache     Notify your health care provider if you experience any of the following:  worsening rash     Notify your health care provider if you experience any of the following:  persistent dizziness, light-headedness, or visual disturbances     Notify your health care provider if you experience any of the following:  increased confusion or weakness     Notify your health care provider if you experience any of the following:     Activity as tolerated       Significant Diagnostic Studies: Labs: CMP   Recent Labs   Lab 12/23/24  1430 12/23/24  1813 12/24/24  0843   * 134* 135*   K 3.8 4.1 3.7    104 105   CO2 19* 18* 20*   * 359* 260*   BUN 6 6 6   CREATININE 0.8 0.9 0.8   CALCIUM 8.3* 8.7 8.9   PROT 6.6 6.9 7.1   ALBUMIN 3.1* 3.3* 3.5   BILITOT 0.3 0.2 0.4   ALKPHOS 133 147 148   AST 33 32 34   ALT 36 36 42   ANIONGAP 11 12 10   , CBC   Recent Labs   Lab 12/23/24  0806 12/24/24  0549   WBC 6.00 5.71   HGB 12.2 11.5*   HCT 38.3 36.4*    409   , and A1C:   Recent Labs   Lab 12/22/24  0916   HGBA1C 12.0*       Pending Diagnostic Studies:       Procedure Component Value Units Date/Time    Comprehensive metabolic panel [[id number]]     Order Status: Sent Lab Status: No result     Specimen: Blood            Medications:  Reconciled Home Medications:      Medication List        START taking these medications      blood sugar diagnostic Strp  To check BG 3 times daily, to use with insurance preferred meter     blood-glucose meter Misc  To check BG 3 times daily, to use with insurance preferred meter     insulin aspart U-100 100 unit/mL (3 mL) Inpn pen  Commonly known as: NovoLOG  Inject 15 Units into the skin 3 (three) times daily.     insulin glargine U-100 (Lantus) 100 unit/mL (3 mL) Inpn pen  Inject 34 Units into the skin  "every evening.     lancets 30 gauge Misc  To check BG 3 times daily, to use with insurance preferred meter     losartan 50 MG tablet  Commonly known as: COZAAR  Take 1 tablet (50 mg total) by mouth once daily.     pen needle, diabetic 32 gauge x 5/32" Ndle  Commonly known as: BD FERMIN 2ND GEN PEN NEEDLE  Use as directed to inject insulin 4 times daily            CHANGE how you take these medications      metFORMIN 500 MG tablet  Commonly known as: GLUCOPHAGE  Take 1 tablet (500 mg total) by mouth 2 (two) times daily with meals. HOLD until seen by PCP  What changed: additional instructions            CONTINUE taking these medications      ondansetron 4 MG Tbdl  Commonly known as: ZOFRAN-ODT  DISSOLVE 1 tablet (4 mg total) by mouth every 8 (eight) hours as needed (nausea).              Indwelling Lines/Drains at time of discharge:   Lines/Drains/Airways       None                   Time spent on the discharge of patient: 45 minutes         Mack Hassan MD  Department of Hospital Medicine  [facility] - Transplant Stepdown  "

## 2024-12-24 NOTE — SUBJECTIVE & OBJECTIVE
Interval History: NAEO. Feeling well today.    Review of Systems   Constitutional:  Negative for fever.   Respiratory:  Negative for shortness of breath.    Cardiovascular:  Negative for chest pain.   Gastrointestinal:  Negative for abdominal pain and vomiting.   Musculoskeletal:  Negative for gait problem.     Objective:     Vital Signs (Most Recent):  Temp: 98.2 °F (36.8 °C) (12/24/24 1127)  Pulse: 74 (12/24/24 1127)  Resp: 16 (12/24/24 1127)  BP: (!) 151/90 (12/24/24 1127)  SpO2: 98 % (12/24/24 1127) Vital Signs (24h Range):  Temp:  [97.5 °F (36.4 °C)-98.4 °F (36.9 °C)] 98.2 °F (36.8 °C)  Pulse:  [60-80] 74  Resp:  [16-18] 16  SpO2:  [93 %-100 %] 98 %  BP: (120-158)/(74-90) 151/90     Weight: (!) 154.2 kg (339 lb 15.2 oz)  Body mass index is 47.41 kg/m².    Intake/Output Summary (Last 24 hours) at 12/24/2024 1134  Last data filed at 12/24/2024 0409  Gross per 24 hour   Intake 1600 ml   Output 3000 ml   Net -1400 ml         Physical Exam  Vitals and nursing note reviewed.   Constitutional:       General: She is not in acute distress.     Appearance: She is obese. She is not toxic-appearing.   HENT:      Head: Normocephalic and atraumatic.   Eyes:      Extraocular Movements: Extraocular movements intact.      Pupils: Pupils are equal, round, and reactive to light.   Cardiovascular:      Rate and Rhythm: Normal rate and regular rhythm.      Heart sounds: No murmur heard.  Pulmonary:      Effort: Pulmonary effort is normal. No respiratory distress.      Breath sounds: No stridor. No wheezing or rhonchi.   Abdominal:      General: Abdomen is flat. There is no distension.   Musculoskeletal:      Cervical back: Normal range of motion and neck supple.   Skin:     General: Skin is warm and dry.   Neurological:      General: No focal deficit present.      Mental Status: She is alert and oriented to person, place, and time.   Psychiatric:         Mood and Affect: Mood normal.         Behavior: Behavior normal.          Thought Content: Thought content normal.         Judgment: Judgment normal.             Significant Labs: All pertinent labs within the past 24 hours have been reviewed.    Significant Imaging: I have reviewed all pertinent imaging results/findings within the past 24 hours.

## 2024-12-24 NOTE — PLAN OF CARE
ON yesterday -Patient Financial Service, reported patient refused screening for Medicaid.         Rosibel Lorenzo LMSW   - [facility]

## 2024-12-24 NOTE — ASSESSMENT & PLAN NOTE
29F with T2DM, HLD and PCOS is admitted for DKA. Transitioned to subcutaneous the first night of admission, now titrating insulin for hyperglycemia.    - on DKA pathway  - increasing to glargine 34 units nightly and aspart 12 units TID  - replete K as needed  - diabetic diet  - providing diabetic education

## 2024-12-30 ENCOUNTER — HOSPITAL ENCOUNTER (EMERGENCY)
Facility: HOSPITAL | Age: 29
Discharge: HOME | End: 2024-12-31
Attending: STUDENT IN AN ORGANIZED HEALTH CARE EDUCATION/TRAINING PROGRAM

## 2024-12-30 DIAGNOSIS — R23.8 SKIN IRRITATION: ICD-10-CM

## 2024-12-30 DIAGNOSIS — R73.9 HYPERGLYCEMIA: ICD-10-CM

## 2024-12-30 DIAGNOSIS — K52.9 GASTROENTERITIS: Primary | ICD-10-CM

## 2024-12-30 DIAGNOSIS — K76.0 HEPATIC STEATOSIS: ICD-10-CM

## 2024-12-30 LAB
ALBUMIN SERPL BCP-MCNC: 3.4 G/DL (ref 3.5–5.2)
ALLENS TEST: ABNORMAL
ALLENS TEST: ABNORMAL
ALP SERPL-CCNC: 145 U/L (ref 40–150)
ALT SERPL W/O P-5'-P-CCNC: 52 U/L (ref 10–44)
ANION GAP SERPL CALC-SCNC: 9 MMOL/L (ref 8–16)
AST SERPL-CCNC: 28 U/L (ref 10–40)
B-OH-BUTYR BLD STRIP-SCNC: 0.2 MMOL/L (ref 0–0.5)
BASOPHILS # BLD AUTO: 0.03 K/UL (ref 0–0.2)
BASOPHILS NFR BLD: 0.2 % (ref 0–1.9)
BILIRUB SERPL-MCNC: 0.3 MG/DL (ref 0.1–1)
BUN SERPL-MCNC: 9 MG/DL (ref 6–20)
CALCIUM SERPL-MCNC: 9 MG/DL (ref 8.7–10.5)
CHLORIDE SERPL-SCNC: 105 MMOL/L (ref 95–110)
CO2 SERPL-SCNC: 24 MMOL/L (ref 23–29)
CREAT SERPL-MCNC: 0.9 MG/DL (ref 0.5–1.4)
DELSYS: ABNORMAL
DIFFERENTIAL METHOD BLD: ABNORMAL
EOSINOPHIL # BLD AUTO: 0 K/UL (ref 0–0.5)
EOSINOPHIL NFR BLD: 0.3 % (ref 0–8)
ERYTHROCYTE [DISTWIDTH] IN BLOOD BY AUTOMATED COUNT: 17.9 % (ref 11.5–14.5)
EST. GFR  (NO RACE VARIABLE): >60 ML/MIN/1.73 M^2
FIO2: 21
GLUCOSE SERPL-MCNC: 358 MG/DL (ref 70–110)
HCO3 UR-SCNC: 24 MMOL/L (ref 24–28)
HCO3 UR-SCNC: 24.4 MMOL/L (ref 24–28)
HCT VFR BLD AUTO: 40.6 % (ref 37–48.5)
HCV AB SERPL QL IA: NORMAL
HGB BLD-MCNC: 12.6 G/DL (ref 12–16)
HIV 1+2 AB+HIV1 P24 AG SERPL QL IA: NORMAL
IMM GRANULOCYTES # BLD AUTO: 0.05 K/UL (ref 0–0.04)
IMM GRANULOCYTES NFR BLD AUTO: 0.4 % (ref 0–0.5)
INFLUENZA A, MOLECULAR: NEGATIVE
INFLUENZA B, MOLECULAR: NEGATIVE
LYMPHOCYTES # BLD AUTO: 1.8 K/UL (ref 1–4.8)
LYMPHOCYTES NFR BLD: 12.5 % (ref 18–48)
MCH RBC QN AUTO: 25.5 PG (ref 27–31)
MCHC RBC AUTO-ENTMCNC: 31 G/DL (ref 32–36)
MCV RBC AUTO: 82 FL (ref 82–98)
MODE: ABNORMAL
MONOCYTES # BLD AUTO: 1.2 K/UL (ref 0.3–1)
MONOCYTES NFR BLD: 8.3 % (ref 4–15)
NEUTROPHILS # BLD AUTO: 11.1 K/UL (ref 1.8–7.7)
NEUTROPHILS NFR BLD: 78.3 % (ref 38–73)
NRBC BLD-RTO: 0 /100 WBC
PCO2 BLDA: 45.8 MMHG (ref 35–45)
PCO2 BLDA: 48.9 MMHG (ref 35–45)
PH SMN: 7.31 [PH] (ref 7.35–7.45)
PH SMN: 7.33 [PH] (ref 7.35–7.45)
PLATELET # BLD AUTO: 386 K/UL (ref 150–450)
PMV BLD AUTO: 9.7 FL (ref 9.2–12.9)
PO2 BLDA: 29 MMHG (ref 40–60)
PO2 BLDA: 40 MMHG (ref 40–60)
POC BE: -2 MMOL/L
POC BE: -2 MMOL/L
POC SATURATED O2: 47 % (ref 95–100)
POC SATURATED O2: 71 % (ref 95–100)
POC TCO2: 25 MMOL/L (ref 24–29)
POC TCO2: 26 MMOL/L (ref 24–29)
POCT GLUCOSE: 349 MG/DL (ref 70–110)
POCT GLUCOSE: 372 MG/DL (ref 70–110)
POTASSIUM SERPL-SCNC: 3.5 MMOL/L (ref 3.5–5.1)
PROT SERPL-MCNC: 7.2 G/DL (ref 6–8.4)
RBC # BLD AUTO: 4.94 M/UL (ref 4–5.4)
SAMPLE: ABNORMAL
SAMPLE: ABNORMAL
SITE: ABNORMAL
SITE: ABNORMAL
SODIUM SERPL-SCNC: 138 MMOL/L (ref 136–145)
SPECIMEN SOURCE: NORMAL
WBC # BLD AUTO: 14.13 K/UL (ref 3.9–12.7)

## 2024-12-30 PROCEDURE — 87389 HIV-1 AG W/HIV-1&-2 AB AG IA: CPT | Performed by: EMERGENCY MEDICINE

## 2024-12-30 PROCEDURE — 93005 ELECTROCARDIOGRAM TRACING: CPT

## 2024-12-30 PROCEDURE — 99900035 HC TECH TIME PER 15 MIN (STAT)

## 2024-12-30 PROCEDURE — 82803 BLOOD GASES ANY COMBINATION: CPT

## 2024-12-30 PROCEDURE — 84443 ASSAY THYROID STIM HORMONE: CPT | Performed by: STUDENT IN AN ORGANIZED HEALTH CARE EDUCATION/TRAINING PROGRAM

## 2024-12-30 PROCEDURE — 82010 KETONE BODYS QUAN: CPT | Performed by: STUDENT IN AN ORGANIZED HEALTH CARE EDUCATION/TRAINING PROGRAM

## 2024-12-30 PROCEDURE — 63600175 PHARM REV CODE 636 W HCPCS: Performed by: STUDENT IN AN ORGANIZED HEALTH CARE EDUCATION/TRAINING PROGRAM

## 2024-12-30 PROCEDURE — 80053 COMPREHEN METABOLIC PANEL: CPT | Performed by: STUDENT IN AN ORGANIZED HEALTH CARE EDUCATION/TRAINING PROGRAM

## 2024-12-30 PROCEDURE — 82962 GLUCOSE BLOOD TEST: CPT

## 2024-12-30 PROCEDURE — 87635 SARS-COV-2 COVID-19 AMP PRB: CPT | Performed by: STUDENT IN AN ORGANIZED HEALTH CARE EDUCATION/TRAINING PROGRAM

## 2024-12-30 PROCEDURE — 82800 BLOOD PH: CPT

## 2024-12-30 PROCEDURE — 96361 HYDRATE IV INFUSION ADD-ON: CPT

## 2024-12-30 PROCEDURE — 87502 INFLUENZA DNA AMP PROBE: CPT | Performed by: STUDENT IN AN ORGANIZED HEALTH CARE EDUCATION/TRAINING PROGRAM

## 2024-12-30 PROCEDURE — 99285 EMERGENCY DEPT VISIT HI MDM: CPT | Mod: 25

## 2024-12-30 PROCEDURE — 94761 N-INVAS EAR/PLS OXIMETRY MLT: CPT | Mod: XB

## 2024-12-30 PROCEDURE — 81025 URINE PREGNANCY TEST: CPT | Performed by: STUDENT IN AN ORGANIZED HEALTH CARE EDUCATION/TRAINING PROGRAM

## 2024-12-30 PROCEDURE — 85025 COMPLETE CBC W/AUTO DIFF WBC: CPT | Performed by: STUDENT IN AN ORGANIZED HEALTH CARE EDUCATION/TRAINING PROGRAM

## 2024-12-30 PROCEDURE — 86803 HEPATITIS C AB TEST: CPT | Performed by: EMERGENCY MEDICINE

## 2024-12-30 PROCEDURE — 96374 THER/PROPH/DIAG INJ IV PUSH: CPT

## 2024-12-30 RX ORDER — METOCLOPRAMIDE HYDROCHLORIDE 5 MG/ML
10 INJECTION INTRAMUSCULAR; INTRAVENOUS
Status: COMPLETED | OUTPATIENT
Start: 2024-12-30 | End: 2024-12-30

## 2024-12-30 RX ADMIN — SODIUM CHLORIDE, POTASSIUM CHLORIDE, SODIUM LACTATE AND CALCIUM CHLORIDE 1000 ML: 600; 310; 30; 20 INJECTION, SOLUTION INTRAVENOUS at 11:12

## 2024-12-30 RX ADMIN — METOCLOPRAMIDE 10 MG: 5 INJECTION, SOLUTION INTRAMUSCULAR; INTRAVENOUS at 10:12

## 2024-12-31 VITALS
WEIGHT: 293 LBS | BODY MASS INDEX: 41.02 KG/M2 | HEIGHT: 71 IN | SYSTOLIC BLOOD PRESSURE: 150 MMHG | OXYGEN SATURATION: 98 % | TEMPERATURE: 99 F | DIASTOLIC BLOOD PRESSURE: 68 MMHG | RESPIRATION RATE: 16 BRPM | HEART RATE: 59 BPM

## 2024-12-31 LAB
B-HCG UR QL: NEGATIVE
BACTERIA #/AREA URNS AUTO: NORMAL /HPF
BILIRUB UR QL STRIP: NEGATIVE
CLARITY UR REFRACT.AUTO: CLEAR
COLOR UR AUTO: YELLOW
CTP QC/QA: YES
GLUCOSE UR QL STRIP: ABNORMAL
HGB UR QL STRIP: NEGATIVE
KETONES UR QL STRIP: NEGATIVE
LEUKOCYTE ESTERASE UR QL STRIP: NEGATIVE
MICROSCOPIC COMMENT: NORMAL
NITRITE UR QL STRIP: NEGATIVE
OHS QRS DURATION: 106 MS
OHS QTC CALCULATION: 474 MS
PH UR STRIP: 6 [PH] (ref 5–8)
PROT UR QL STRIP: NEGATIVE
RBC #/AREA URNS AUTO: 0 /HPF (ref 0–4)
SARS-COV-2 RDRP RESP QL NAA+PROBE: NEGATIVE
SP GR UR STRIP: >1.03 (ref 1–1.03)
SQUAMOUS #/AREA URNS AUTO: 4 /HPF
TSH SERPL DL<=0.005 MIU/L-ACNC: 0.73 UIU/ML (ref 0.4–4)
URN SPEC COLLECT METH UR: ABNORMAL
WBC #/AREA URNS AUTO: 2 /HPF (ref 0–5)
YEAST UR QL AUTO: NORMAL

## 2024-12-31 PROCEDURE — 81001 URINALYSIS AUTO W/SCOPE: CPT | Performed by: STUDENT IN AN ORGANIZED HEALTH CARE EDUCATION/TRAINING PROGRAM

## 2024-12-31 PROCEDURE — 25500020 PHARM REV CODE 255: Performed by: STUDENT IN AN ORGANIZED HEALTH CARE EDUCATION/TRAINING PROGRAM

## 2024-12-31 RX ADMIN — IOHEXOL 100 ML: 350 INJECTION, SOLUTION INTRAVENOUS at 12:12

## 2024-12-31 NOTE — ED PROVIDER NOTES
Encounter Date: 12/30/2024       History     Chief Complaint   Patient presents with    Abdominal Pain     Pt endorses abdominal pain and N/V/D for the past week.  with EMS. Pt was treated for DKA last week. Pt AAOx4 but very lethargic.     HPI    29-year-old female past medical history hypertension, diabetes, DKA who presents to the ER for evaluation of nausea.  Patient was seen and evaluated here 1 week ago for DKA.    She presents today by EMS with reported abdominal pain and nausea.    Glucose 400 by EMS.    No noted surgical history.    She denies any fevers, chills, chest pain, dyspnea, syncope, or any dysuria.      Review of patient's allergies indicates:  No Known Allergies  Past Medical History:   Diagnosis Date    Anemia     Hypertension      History reviewed. No pertinent surgical history.  Family History   Problem Relation Name Age of Onset    Hypertension Sister      Cancer Maternal Grandmother      Diabetes Maternal Grandmother       Social History     Tobacco Use    Smoking status: Every Day     Types: Cigarettes    Smokeless tobacco: Never    Tobacco comments:     Few cigarettes a day   Substance Use Topics    Alcohol use: Yes     Comment: occassinal    Drug use: No     Review of Systems   Gastrointestinal:  Positive for nausea.       Physical Exam     Initial Vitals [12/30/24 2146]   BP Pulse Resp Temp SpO2   (!) 180/102 110 (!) 25 98.4 °F (36.9 °C) 99 %      MAP       --         Physical Exam    Nursing note and vitals reviewed.  Constitutional: She appears well-nourished.   HENT:   Head: Atraumatic.   Eyes: EOM are normal.   Neck: Neck supple.   Cardiovascular:  Normal rate and regular rhythm.           Pulmonary/Chest: Breath sounds normal. No respiratory distress.   Abdominal:   Mild generalized abdominal pain on exam   Genitourinary:    Genitourinary Comments: RN chaperone present for exam; mild external skin irritation around vaginal area, no lesions noted     Musculoskeletal:          General: No edema. Normal range of motion.      Cervical back: Neck supple.     Neurological: She is alert and oriented to person, place, and time.   Following all commands, 5/5 strength bilateral upper and lower extremities, able to sit up, normal speech   Skin: Skin is warm and dry.   Psychiatric: She has a normal mood and affect. Thought content normal.         ED Course   Procedures  Labs Reviewed   CBC W/ AUTO DIFFERENTIAL - Abnormal       Result Value    WBC 14.13 (*)     RBC 4.94      Hemoglobin 12.6      Hematocrit 40.6      MCV 82      MCH 25.5 (*)     MCHC 31.0 (*)     RDW 17.9 (*)     Platelets 386      MPV 9.7      Immature Granulocytes 0.4      Gran # (ANC) 11.1 (*)     Immature Grans (Abs) 0.05 (*)     Lymph # 1.8      Mono # 1.2 (*)     Eos # 0.0      Baso # 0.03      nRBC 0      Gran % 78.3 (*)     Lymph % 12.5 (*)     Mono % 8.3      Eosinophil % 0.3      Basophil % 0.2      Differential Method Automated     COMPREHENSIVE METABOLIC PANEL - Abnormal    Sodium 138      Potassium 3.5      Chloride 105      CO2 24      Glucose 358 (*)     BUN 9      Creatinine 0.9      Calcium 9.0      Total Protein 7.2      Albumin 3.4 (*)     Total Bilirubin 0.3      Alkaline Phosphatase 145      AST 28      ALT 52 (*)     eGFR >60.0      Anion Gap 9     URINALYSIS, REFLEX TO URINE CULTURE - Abnormal    Specimen UA Urine, Clean Catch      Color, UA Yellow      Appearance, UA Clear      pH, UA 6.0      Specific Gravity, UA >1.030 (*)     Protein, UA Negative      Glucose, UA 4+ (*)     Ketones, UA Negative      Bilirubin (UA) Negative      Occult Blood UA Negative      Nitrite, UA Negative      Leukocytes, UA Negative      Narrative:     Specimen Source->Urine   POCT GLUCOSE - Abnormal    POCT Glucose 372 (*)    ISTAT PROCEDURE - Abnormal    POC PH 7.328 (*)     POC PCO2 45.8 (*)     POC PO2 40      POC HCO3 24.0      POC BE -2      POC SATURATED O2 71      POC TCO2 25      Sample VENOUS      Site Fausto Slaughter  Test N/A     POCT GLUCOSE - Abnormal    POCT Glucose 349 (*)    ISTAT PROCEDURE - Abnormal    POC PH 7.306 (*)     POC PCO2 48.9 (*)     POC PO2 29 (*)     POC HCO3 24.4      POC BE -2      POC SATURATED O2 47      POC TCO2 26      Sample VENOUS      Site Other      Allens Test N/A      DelSys Room Air      Mode SPONT      FiO2 21     INFLUENZA A & B BY MOLECULAR    Influenza A, Molecular Negative      Influenza B, Molecular Negative      Flu A & B Source Nasal swab     HEPATITIS C ANTIBODY    Hepatitis C Ab Non-reactive      Narrative:     Release to patient->Immediate   HIV 1 / 2 ANTIBODY    HIV 1/2 Ag/Ab Non-reactive      Narrative:     Release to patient->Immediate   BETA - HYDROXYBUTYRATE, SERUM    Beta-Hydroxybutyrate 0.2     SARS-COV-2 RNA AMPLIFICATION, QUAL    SARS-CoV-2 RNA, Amplification, Qual Negative     TSH   TSH    TSH 0.726      Narrative:     Add on TSH per Dr. Multani @ 23:34 pm to order # [id number]   URINALYSIS MICROSCOPIC    RBC, UA 0      WBC, UA 2      Bacteria Rare      Yeast, UA None      Squam Epithel, UA 4      Microscopic Comment SEE COMMENT      Narrative:     Specimen Source->Urine   POCT URINE PREGNANCY    POC Preg Test, Ur Negative       Acceptable Yes          ECG Results              EKG 12-lead (Final result)        Collection Time Result Time QRS Duration OHS QTC Calculation    12/30/24 22:16:24 12/31/24 11:07:16 106 474                     Final result by Interface, Lab In [facility] (12/31/24 11:07:20)                   Narrative:    Test Reason : R73.9,    Vent. Rate :  97 BPM     Atrial Rate :  97 BPM     P-R Int : 170 ms          QRS Dur : 106 ms      QT Int : 374 ms       P-R-T Axes :  57  -1  37 degrees    QTcB Int : 474 ms    Normal sinus rhythm  Normal ECG  When compared with ECG of 22-Dec-2024 09:09,  Vent. rate has increased by  37 bpm  Confirmed by Boris Cohen ([id number]) on 12/31/2024 11:07:11 AM    Referred By: AAAREFERRAL SELF           Confirmed By: Boris  Noel                                  Imaging Results              CT Abdomen Pelvis With IV Contrast NO Oral Contrast (Final result)  Result time 12/31/24 03:18:16      Final result by Chan Tafoya MD (12/31/24 03:18:16)                   Impression:      Abdomen CT and Pelvis CT:    In the appropriate clinical context, the intestinal findings would be compatible with a diarrheal illness, such as gastroenteritis.  These CT findings are somewhat similar to those from 11/27/2024.  Correlate clinically.    Probable polycystic ovarian morphology.    Possible mild diffuse hepatic steatosis.    Other observations as detailed in the body of the report.      Electronically signed by: Chan Tafoya  Date:    12/31/2024  Time:    03:18               Narrative:    EXAMINATION:  CT ABDOMEN PELVIS WITH IV CONTRAST    CLINICAL HISTORY:  Abdominal pain, acute, nonlocalized;    TECHNIQUE:  Low dose axial images, sagittal and coronal reformations were obtained from the lung bases to the pubic symphysis following the IV administration of 100 mL of Omnipaque 350    COMPARISON:  Abdominopelvic CT 11/27/2024.    Pelvic ultrasound 11/14/2017    FINDINGS:  Abdomen CT and Pelvis CT:    Artifacts related to beam hardening and/or motion degrade portions of the scan.    Allowing for this, the main finding consists of fluid-filled nondilated distal small bowel loops, and a predominantly fluid-filled nondilated colon, with some scattered feculent fluid but no well formed colonic intraluminal fecal residue.  Mild diverticulosis coli, no CT evidence of acute diverticulitis.    Appendix or appendiceal stump appears normal.    Stomach poorly distended limiting CT assessment.  Duodenal C-loop demonstrates a normal course.    Small quantity of intraluminal gas in the distal esophagus, possibly related to gastroesophageal reflux.    Otherwise, the visualized caudal portions of the lungs, pleura, heart, and pericardium demonstrate no acute CT  abnormalities.    Liver parenchyma appears mildly and diffusely hypoattenuating relative to that of the spleen.  Although physiologic differences in contrast enhancement and technical factors can contribute to this appearance, in the appropriate clinical context some underlying diffuse hepatic steatosis would also be a consideration.    The liver remains enlarged, with craniocaudal extent of approximately 20 cm.  There remains a small geographic region of further diminished attenuation in the anteromedial aspect of the left hepatic lobe medial segment, as could be seen with focal fatty infiltration.    Gallbladder, biliary tree, pancreas, spleen, right adrenal gland, left adrenal gland, right kidney, left kidney, abdominal aorta, IVC, abdominal lymph nodes, pelvic lymph nodes, uterus, visualized body wall, and visualized skeleton demonstrate no acute CT abnormalities.    No free intraperitoneal air.  No intraperitoneal fluid.  No radiopaque renal calculus, hydronephrosis, or hydroureter.    Uterus normal size.  Hypoattenuating appearance of the endometrium; this can be physiologic, some a trace endometrial fluid is neither confirmed nor fully excluded.    Continued prominence of both ovaries, likely on the basis of polycystic ovarian morphology.    Mild degenerative skeletal changes.                                       Medications   metoclopramide injection 10 mg (10 mg Intravenous Given 12/30/24 2259)   lactated ringers bolus 1,000 mL (0 mLs Intravenous Stopped 12/31/24 0002)   iohexoL (OMNIPAQUE 350) injection 100 mL (100 mLs Intravenous Given 12/31/24 0034)     Medical Decision Making  Amount and/or Complexity of Data Reviewed  Labs: ordered. Decision-making details documented in ED Course.  Radiology: ordered. Decision-making details documented in ED Course.  ECG/medicine tests: ordered and independent interpretation performed. Decision-making details documented in ED Course.    Risk  OTC drugs.  Prescription  drug management.                     EKG:  Rate 97  NSR  No STEMI                 28 y/o F here with nausea.  VSS in ED, normal neuro exam, mild abd pain.  Normal Hgb, normal creat, bicarb/BHB normal, not in DKA.  UA with no UTI, upreg negative. COVID/Flu negative.   She was given IVF and reglan; on reeval she was feeling much better; able to tolerate PO without any issue.  CT showing likely gastroenteritis, fatty liver, and PCOS (patient already aware of this)  Patient also c/o vaginal skin irritation, examined groin area with RN chaperone, mild irritation, will try barrier cream script.  F/U with PCP and GYN, take medications as prescribed, strict RTER precautions given, all questions answered, patient expressed understanding, DC home.       Clinical Impression:  Final diagnoses:  [R73.9] Hyperglycemia  [K52.9] Gastroenteritis (Primary)  [K76.0] Hepatic steatosis  [R23.8] Skin irritation          ED Disposition Condition    Discharge Stable          ED Prescriptions       Medication Sig Dispense Start Date End Date Auth. Provider    zinc oxide 10 % Oint Apply topically as needed 85 g 12/31/2024 -- French Multani MD          Follow-up Information       Follow up With Specialties Details Why Contact Info    Stacey Scherer MD Internal Medicine   [address]  [address]  [facility] [id number] [phone]      [facility] - Emergency Dept Emergency Medicine  As needed [address] [facility] [address] [phone]             French Multani MD  12/31/24 205

## 2024-12-31 NOTE — ED TRIAGE NOTES
Lisbeth Moran, a 29 y.o. female presents to the ED w/ complaint of abdominal pain    Patient c/o abdominal pain, nausea/vomiting, and diarrhea for the past week. Endorses 9/10 abdominal pain. Denies chest pain and SOB     Triage note:  Chief Complaint   Patient presents with    Abdominal Pain     Pt endorses abdominal pain and N/V/D for the past week.  with EMS. Pt was treated for DKA last week. Pt AAOx4 but very lethargic.     Review of patient's allergies indicates:  No Known Allergies  Past Medical History:   Diagnosis Date    Anemia     Hypertension